# Patient Record
Sex: FEMALE | Race: WHITE | NOT HISPANIC OR LATINO | Employment: UNEMPLOYED | URBAN - METROPOLITAN AREA
[De-identification: names, ages, dates, MRNs, and addresses within clinical notes are randomized per-mention and may not be internally consistent; named-entity substitution may affect disease eponyms.]

---

## 2017-01-01 ENCOUNTER — HOSPITAL ENCOUNTER (OUTPATIENT)
Facility: MEDICAL CENTER | Age: 32
End: 2017-01-01
Attending: SPECIALIST | Admitting: SPECIALIST
Payer: MEDICAID

## 2017-01-01 VITALS — HEIGHT: 66 IN | BODY MASS INDEX: 17.96 KG/M2 | WEIGHT: 111.77 LBS

## 2017-01-01 DIAGNOSIS — Z01.812 PRE-OPERATIVE LABORATORY EXAMINATION: ICD-10-CM

## 2017-01-01 LAB
ERYTHROCYTE [DISTWIDTH] IN BLOOD BY AUTOMATED COUNT: 50.3 FL (ref 35.9–50)
HCT VFR BLD AUTO: 31.8 % (ref 37–47)
HGB BLD-MCNC: 10.6 G/DL (ref 12–16)
MCH RBC QN AUTO: 32.3 PG (ref 27–33)
MCHC RBC AUTO-ENTMCNC: 33.3 G/DL (ref 33.6–35)
MCV RBC AUTO: 97 FL (ref 81.4–97.8)
MORPHOLOGY BLD-IMP: NORMAL
PLATELET # BLD AUTO: 27 K/UL (ref 164–446)
PLATELETS.RETICULATED NFR BLD AUTO: 8.9 K/UL (ref 0.6–13.1)
RBC # BLD AUTO: 3.28 M/UL (ref 4.2–5.4)
WBC # BLD AUTO: 4.2 K/UL (ref 4.8–10.8)

## 2017-01-01 PROCEDURE — 36415 COLL VENOUS BLD VENIPUNCTURE: CPT

## 2017-01-01 PROCEDURE — 85027 COMPLETE CBC AUTOMATED: CPT

## 2017-01-01 PROCEDURE — 85055 RETICULATED PLATELET ASSAY: CPT

## 2018-01-01 ENCOUNTER — APPOINTMENT (OUTPATIENT)
Dept: RADIOLOGY | Facility: MEDICAL CENTER | Age: 33
DRG: 025 | End: 2018-01-01
Attending: NEUROLOGICAL SURGERY
Payer: MEDICAID

## 2018-01-01 ENCOUNTER — RESOLUTE PROFESSIONAL BILLING HOSPITAL PROF FEE (OUTPATIENT)
Dept: HOSPITALIST | Facility: MEDICAL CENTER | Age: 33
End: 2018-01-01
Payer: MEDICAID

## 2018-01-01 ENCOUNTER — HOSPITAL ENCOUNTER (INPATIENT)
Facility: MEDICAL CENTER | Age: 33
LOS: 11 days | DRG: 025 | End: 2018-02-09
Attending: EMERGENCY MEDICINE | Admitting: SURGERY
Payer: MEDICAID

## 2018-01-01 ENCOUNTER — APPOINTMENT (OUTPATIENT)
Dept: RADIOLOGY | Facility: MEDICAL CENTER | Age: 33
DRG: 025 | End: 2018-01-01
Attending: SURGERY
Payer: MEDICAID

## 2018-01-01 ENCOUNTER — APPOINTMENT (OUTPATIENT)
Dept: RADIOLOGY | Facility: MEDICAL CENTER | Age: 33
DRG: 025 | End: 2018-01-01
Attending: EMERGENCY MEDICINE
Payer: MEDICAID

## 2018-01-01 ENCOUNTER — HOSPITAL ENCOUNTER (INPATIENT)
Facility: MEDICAL CENTER | Age: 33
LOS: 1 days | DRG: 951 | End: 2018-02-09
Admitting: SURGERY
Payer: COMMERCIAL

## 2018-01-01 ENCOUNTER — APPOINTMENT (OUTPATIENT)
Dept: RADIOLOGY | Facility: MEDICAL CENTER | Age: 33
DRG: 025 | End: 2018-01-01
Attending: PHYSICIAN ASSISTANT
Payer: MEDICAID

## 2018-01-01 VITALS
HEIGHT: 66 IN | WEIGHT: 197.09 LBS | TEMPERATURE: 101.5 F | OXYGEN SATURATION: 96 % | SYSTOLIC BLOOD PRESSURE: 97 MMHG | BODY MASS INDEX: 31.68 KG/M2 | RESPIRATION RATE: 16 BRPM | DIASTOLIC BLOOD PRESSURE: 48 MMHG | HEART RATE: 82 BPM

## 2018-01-01 DIAGNOSIS — I62.9 INTRACRANIAL HEMORRHAGE (HCC): ICD-10-CM

## 2018-01-01 LAB
ABO GROUP BLD: NORMAL
ABO GROUP BLD: NORMAL
ACTION RANGE TRIGGERED IACRT: NO
ACTION RANGE TRIGGERED IACRT: YES
ACTION RANGE TRIGGERED IACRT: YES
ACTION RH IMMUNE GLOB 8505RHG: NORMAL
ALBUMIN SERPL BCP-MCNC: 2.6 G/DL (ref 3.2–4.9)
ALBUMIN SERPL BCP-MCNC: 2.8 G/DL (ref 3.2–4.9)
ALBUMIN SERPL BCP-MCNC: 3.1 G/DL (ref 3.2–4.9)
ALBUMIN SERPL BCP-MCNC: 3.1 G/DL (ref 3.2–4.9)
ALBUMIN SERPL BCP-MCNC: 3.2 G/DL (ref 3.2–4.9)
ALBUMIN SERPL BCP-MCNC: 3.2 G/DL (ref 3.2–4.9)
ALBUMIN SERPL BCP-MCNC: 4.3 G/DL (ref 3.2–4.9)
ALBUMIN/GLOB SERPL: 0.8 G/DL
ALBUMIN/GLOB SERPL: 1 G/DL
ALBUMIN/GLOB SERPL: 1.3 G/DL
ALBUMIN/GLOB SERPL: 1.4 G/DL
ALBUMIN/GLOB SERPL: 1.6 G/DL
ALBUMIN/GLOB SERPL: 1.7 G/DL
ALP SERPL-CCNC: 110 U/L (ref 30–99)
ALP SERPL-CCNC: 131 U/L (ref 30–99)
ALP SERPL-CCNC: 55 U/L (ref 30–99)
ALP SERPL-CCNC: 55 U/L (ref 30–99)
ALP SERPL-CCNC: 70 U/L (ref 30–99)
ALP SERPL-CCNC: 94 U/L (ref 30–99)
ALT SERPL-CCNC: 21 U/L (ref 2–50)
ALT SERPL-CCNC: 22 U/L (ref 2–50)
ALT SERPL-CCNC: 23 U/L (ref 2–50)
ALT SERPL-CCNC: 24 U/L (ref 2–50)
ALT SERPL-CCNC: 27 U/L (ref 2–50)
ALT SERPL-CCNC: 36 U/L (ref 2–50)
AMPHET UR QL SCN: NEGATIVE
ANION GAP SERPL CALC-SCNC: 11 MMOL/L (ref 0–11.9)
ANION GAP SERPL CALC-SCNC: 14 MMOL/L (ref 0–11.9)
ANION GAP SERPL CALC-SCNC: 14 MMOL/L (ref 0–11.9)
ANION GAP SERPL CALC-SCNC: 15 MMOL/L (ref 0–11.9)
ANION GAP SERPL CALC-SCNC: 15 MMOL/L (ref 0–11.9)
ANION GAP SERPL CALC-SCNC: 3 MMOL/L (ref 0–11.9)
ANION GAP SERPL CALC-SCNC: 4 MMOL/L (ref 0–11.9)
ANION GAP SERPL CALC-SCNC: 4 MMOL/L (ref 0–11.9)
ANION GAP SERPL CALC-SCNC: 5 MMOL/L (ref 0–11.9)
ANION GAP SERPL CALC-SCNC: 6 MMOL/L (ref 0–11.9)
ANION GAP SERPL CALC-SCNC: 7 MMOL/L (ref 0–11.9)
ANION GAP SERPL CALC-SCNC: 8 MMOL/L (ref 0–11.9)
ANISOCYTOSIS BLD QL SMEAR: ABNORMAL
APPEARANCE UR: CLEAR
APPEARANCE UR: CLEAR
APTT PPP: 31.2 SEC (ref 24.7–36)
APTT PPP: 33.3 SEC (ref 24.7–36)
APTT PPP: 33.8 SEC (ref 24.7–36)
APTT PPP: 34.1 SEC (ref 24.7–36)
APTT PPP: 36.4 SEC (ref 24.7–36)
AST SERPL-CCNC: 156 U/L (ref 12–45)
AST SERPL-CCNC: 60 U/L (ref 12–45)
AST SERPL-CCNC: 61 U/L (ref 12–45)
AST SERPL-CCNC: 67 U/L (ref 12–45)
AST SERPL-CCNC: 72 U/L (ref 12–45)
AST SERPL-CCNC: 73 U/L (ref 12–45)
BACTERIA #/AREA URNS HPF: NEGATIVE /HPF
BACTERIA #/AREA URNS HPF: NEGATIVE /HPF
BACTERIA SPEC RESP CULT: ABNORMAL
BARBITURATES UR QL SCN: NEGATIVE
BARCODED ABORH UBTYP: 1700
BARCODED ABORH UBTYP: 5100
BARCODED ABORH UBTYP: 5100
BARCODED ABORH UBTYP: 600
BARCODED ABORH UBTYP: 6200
BARCODED ABORH UBTYP: 6200
BARCODED ABORH UBTYP: 7300
BARCODED ABORH UBTYP: 7300
BARCODED ABORH UBTYP: 8400
BARCODED ABORH UBTYP: 9500
BARCODED PRD CODE UBPRD: NORMAL
BARCODED UNIT NUM UBUNT: NORMAL
BASE EXCESS BLDA CALC-SCNC: -2 MMOL/L (ref -4–3)
BASE EXCESS BLDA CALC-SCNC: -3 MMOL/L (ref -4–3)
BASE EXCESS BLDA CALC-SCNC: -4 MMOL/L (ref -4–3)
BASE EXCESS BLDA CALC-SCNC: -6 MMOL/L (ref -4–3)
BASE EXCESS BLDA CALC-SCNC: -6 MMOL/L (ref -4–3)
BASOPHILS # BLD AUTO: 0 % (ref 0–1.8)
BASOPHILS # BLD AUTO: 0.1 % (ref 0–1.8)
BASOPHILS # BLD AUTO: 0.1 % (ref 0–1.8)
BASOPHILS # BLD AUTO: 0.3 % (ref 0–1.8)
BASOPHILS # BLD AUTO: 0.9 % (ref 0–1.8)
BASOPHILS # BLD AUTO: 1.7 % (ref 0–1.8)
BASOPHILS # BLD: 0 K/UL (ref 0–0.12)
BASOPHILS # BLD: 0.01 K/UL (ref 0–0.12)
BASOPHILS # BLD: 0.01 K/UL (ref 0–0.12)
BASOPHILS # BLD: 0.03 K/UL (ref 0–0.12)
BASOPHILS # BLD: 0.09 K/UL (ref 0–0.12)
BASOPHILS # BLD: 0.13 K/UL (ref 0–0.12)
BASOPHILS # BLD: 0.25 K/UL (ref 0–0.12)
BASOPHILS # BLD: 0.25 K/UL (ref 0–0.12)
BENZODIAZ UR QL SCN: NEGATIVE
BILIRUB CONJ SERPL-MCNC: 1.8 MG/DL (ref 0.1–0.5)
BILIRUB CONJ SERPL-MCNC: 2.4 MG/DL (ref 0.1–0.5)
BILIRUB INDIRECT SERPL-MCNC: 1.3 MG/DL (ref 0–1)
BILIRUB INDIRECT SERPL-MCNC: 1.3 MG/DL (ref 0–1)
BILIRUB SERPL-MCNC: 2.4 MG/DL (ref 0.1–1.5)
BILIRUB SERPL-MCNC: 3.1 MG/DL (ref 0.1–1.5)
BILIRUB SERPL-MCNC: 3.2 MG/DL (ref 0.1–1.5)
BILIRUB SERPL-MCNC: 3.7 MG/DL (ref 0.1–1.5)
BILIRUB SERPL-MCNC: 4.2 MG/DL (ref 0.1–1.5)
BILIRUB SERPL-MCNC: 5.5 MG/DL (ref 0.1–1.5)
BILIRUB UR QL STRIP.AUTO: ABNORMAL
BILIRUB UR QL STRIP.AUTO: NEGATIVE
BLD GP AB INVEST PLASRBC-IMP: NORMAL
BLD GP AB INVEST PLASRBC-IMP: NORMAL
BLD GP AB SCN SERPL QL: NORMAL
BLD GP AB SCN SERPL QL: NORMAL
BODY TEMPERATURE: ABNORMAL DEGREES
BUN SERPL-MCNC: 10 MG/DL (ref 8–22)
BUN SERPL-MCNC: 11 MG/DL (ref 8–22)
BUN SERPL-MCNC: 13 MG/DL (ref 8–22)
BUN SERPL-MCNC: 14 MG/DL (ref 8–22)
BUN SERPL-MCNC: 15 MG/DL (ref 8–22)
BUN SERPL-MCNC: 16 MG/DL (ref 8–22)
BUN SERPL-MCNC: 17 MG/DL (ref 8–22)
BUN SERPL-MCNC: 19 MG/DL (ref 8–22)
BUN SERPL-MCNC: 20 MG/DL (ref 8–22)
BUN SERPL-MCNC: 22 MG/DL (ref 8–22)
BUN SERPL-MCNC: 27 MG/DL (ref 8–22)
BUN SERPL-MCNC: 31 MG/DL (ref 8–22)
BUN SERPL-MCNC: 4 MG/DL (ref 8–22)
BUN SERPL-MCNC: 4 MG/DL (ref 8–22)
BUN SERPL-MCNC: 44 MG/DL (ref 8–22)
BUN SERPL-MCNC: 5 MG/DL (ref 8–22)
BUN SERPL-MCNC: 53 MG/DL (ref 8–22)
BUN SERPL-MCNC: 59 MG/DL (ref 8–22)
BUN SERPL-MCNC: 6 MG/DL (ref 8–22)
BUN SERPL-MCNC: 61 MG/DL (ref 8–22)
BUN SERPL-MCNC: 62 MG/DL (ref 8–22)
BUN SERPL-MCNC: 7 MG/DL (ref 8–22)
BUN SERPL-MCNC: 8 MG/DL (ref 8–22)
BUN SERPL-MCNC: 8 MG/DL (ref 8–22)
BUN SERPL-MCNC: 9 MG/DL (ref 8–22)
BUN SERPL-MCNC: 9 MG/DL (ref 8–22)
BURR CELLS BLD QL SMEAR: NORMAL
BZE UR QL SCN: NEGATIVE
CA-I BLD ISE-SCNC: 0.93 MMOL/L (ref 1.1–1.3)
CALCIUM SERPL-MCNC: 7.2 MG/DL (ref 8.5–10.5)
CALCIUM SERPL-MCNC: 7.2 MG/DL (ref 8.5–10.5)
CALCIUM SERPL-MCNC: 7.3 MG/DL (ref 8.5–10.5)
CALCIUM SERPL-MCNC: 7.4 MG/DL (ref 8.5–10.5)
CALCIUM SERPL-MCNC: 7.5 MG/DL (ref 8.5–10.5)
CALCIUM SERPL-MCNC: 7.6 MG/DL (ref 8.5–10.5)
CALCIUM SERPL-MCNC: 7.7 MG/DL (ref 8.5–10.5)
CALCIUM SERPL-MCNC: 7.8 MG/DL (ref 8.5–10.5)
CALCIUM SERPL-MCNC: 7.9 MG/DL (ref 8.5–10.5)
CALCIUM SERPL-MCNC: 7.9 MG/DL (ref 8.5–10.5)
CALCIUM SERPL-MCNC: 8 MG/DL (ref 8.5–10.5)
CALCIUM SERPL-MCNC: 8.5 MG/DL (ref 8.5–10.5)
CALCIUM SERPL-MCNC: 8.7 MG/DL (ref 8.5–10.5)
CALCIUM SERPL-MCNC: 8.9 MG/DL (ref 8.5–10.5)
CALCIUM SERPL-MCNC: 9.1 MG/DL (ref 8.5–10.5)
CANNABINOIDS UR QL SCN: POSITIVE
CFT BLD TEG: 5.4 MIN (ref 5–10)
CFT BLD TEG: 6.2 MIN (ref 5–10)
CFT BLD TEG: 6.4 MIN (ref 5–10)
CFT BLD TEG: 6.4 MIN (ref 5–10)
CFT BLD TEG: 6.8 MIN (ref 5–10)
CFT BLD TEG: 7.1 MIN (ref 5–10)
CHLORIDE SERPL-SCNC: 103 MMOL/L (ref 96–112)
CHLORIDE SERPL-SCNC: 111 MMOL/L (ref 96–112)
CHLORIDE SERPL-SCNC: 111 MMOL/L (ref 96–112)
CHLORIDE SERPL-SCNC: 112 MMOL/L (ref 96–112)
CHLORIDE SERPL-SCNC: 112 MMOL/L (ref 96–112)
CHLORIDE SERPL-SCNC: 113 MMOL/L (ref 96–112)
CHLORIDE SERPL-SCNC: 114 MMOL/L (ref 96–112)
CHLORIDE SERPL-SCNC: 115 MMOL/L (ref 96–112)
CHLORIDE SERPL-SCNC: 115 MMOL/L (ref 96–112)
CHLORIDE SERPL-SCNC: 116 MMOL/L (ref 96–112)
CHLORIDE SERPL-SCNC: 118 MMOL/L (ref 96–112)
CHLORIDE SERPL-SCNC: 119 MMOL/L (ref 96–112)
CHLORIDE SERPL-SCNC: 119 MMOL/L (ref 96–112)
CHLORIDE SERPL-SCNC: 120 MMOL/L (ref 96–112)
CHLORIDE SERPL-SCNC: 120 MMOL/L (ref 96–112)
CHLORIDE SERPL-SCNC: 121 MMOL/L (ref 96–112)
CHLORIDE SERPL-SCNC: 121 MMOL/L (ref 96–112)
CHLORIDE SERPL-SCNC: 122 MMOL/L (ref 96–112)
CHLORIDE SERPL-SCNC: 122 MMOL/L (ref 96–112)
CHLORIDE SERPL-SCNC: 123 MMOL/L (ref 96–112)
CHLORIDE SERPL-SCNC: 124 MMOL/L (ref 96–112)
CHLORIDE SERPL-SCNC: 124 MMOL/L (ref 96–112)
CHLORIDE SERPL-SCNC: 125 MMOL/L (ref 96–112)
CLOT ANGLE BLD TEG: 58.5 DEGREES (ref 53–72)
CLOT ANGLE BLD TEG: 63.4 DEGREES (ref 53–72)
CLOT ANGLE BLD TEG: 63.5 DEGREES (ref 53–72)
CLOT ANGLE BLD TEG: 66.2 DEGREES (ref 53–72)
CLOT ANGLE BLD TEG: 67.5 DEGREES (ref 53–72)
CLOT ANGLE BLD TEG: 68.6 DEGREES (ref 53–72)
CLOT ANGLE BLD TEG: 68.8 DEGREES (ref 53–72)
CLOT ANGLE BLD TEG: 70.7 DEGREES (ref 53–72)
CLOT LYSIS 30M P MA LENFR BLD TEG: 0 % (ref 0–8)
CLOT LYSIS 30M P MA LENFR BLD TEG: 0.1 % (ref 0–8)
CLOT LYSIS 30M P MA LENFR BLD TEG: 0.3 % (ref 0–8)
CO2 BLDA-SCNC: 19 MMOL/L (ref 20–33)
CO2 BLDA-SCNC: 21 MMOL/L (ref 20–33)
CO2 BLDA-SCNC: 23 MMOL/L (ref 20–33)
CO2 BLDA-SCNC: 24 MMOL/L (ref 20–33)
CO2 BLDA-SCNC: 25 MMOL/L (ref 20–33)
CO2 BLDA-SCNC: 25 MMOL/L (ref 20–33)
CO2 BLDA-SCNC: 27 MMOL/L (ref 20–33)
CO2 SERPL-SCNC: 18 MMOL/L (ref 20–33)
CO2 SERPL-SCNC: 20 MMOL/L (ref 20–33)
CO2 SERPL-SCNC: 21 MMOL/L (ref 20–33)
CO2 SERPL-SCNC: 22 MMOL/L (ref 20–33)
CO2 SERPL-SCNC: 23 MMOL/L (ref 20–33)
CO2 SERPL-SCNC: 24 MMOL/L (ref 20–33)
CO2 SERPL-SCNC: 26 MMOL/L (ref 20–33)
COLOR UR: ABNORMAL
COLOR UR: ABNORMAL
COMMENT 1642: NORMAL
COMMENT 1642: NORMAL
COMPONENT FT 8504FT: NORMAL
COMPONENT P 8504P: NORMAL
COMPONENT R 8504R: NORMAL
CREAT SERPL-MCNC: 0.33 MG/DL (ref 0.5–1.4)
CREAT SERPL-MCNC: 0.36 MG/DL (ref 0.5–1.4)
CREAT SERPL-MCNC: 0.37 MG/DL (ref 0.5–1.4)
CREAT SERPL-MCNC: 0.38 MG/DL (ref 0.5–1.4)
CREAT SERPL-MCNC: 0.39 MG/DL (ref 0.5–1.4)
CREAT SERPL-MCNC: 0.4 MG/DL (ref 0.5–1.4)
CREAT SERPL-MCNC: 0.41 MG/DL (ref 0.5–1.4)
CREAT SERPL-MCNC: 0.42 MG/DL (ref 0.5–1.4)
CREAT SERPL-MCNC: 0.42 MG/DL (ref 0.5–1.4)
CREAT SERPL-MCNC: 0.43 MG/DL (ref 0.5–1.4)
CREAT SERPL-MCNC: 0.43 MG/DL (ref 0.5–1.4)
CREAT SERPL-MCNC: 0.44 MG/DL (ref 0.5–1.4)
CREAT SERPL-MCNC: 0.45 MG/DL (ref 0.5–1.4)
CREAT SERPL-MCNC: 0.49 MG/DL (ref 0.5–1.4)
CREAT SERPL-MCNC: 0.5 MG/DL (ref 0.5–1.4)
CREAT SERPL-MCNC: 0.5 MG/DL (ref 0.5–1.4)
CREAT SERPL-MCNC: 0.68 MG/DL (ref 0.5–1.4)
CREAT SERPL-MCNC: 0.85 MG/DL (ref 0.5–1.4)
CREAT SERPL-MCNC: 1.03 MG/DL (ref 0.5–1.4)
CREAT SERPL-MCNC: 1.09 MG/DL (ref 0.5–1.4)
CREAT SERPL-MCNC: 1.15 MG/DL (ref 0.5–1.4)
CREAT SERPL-MCNC: 1.18 MG/DL (ref 0.5–1.4)
CRP SERPL HS-MCNC: 8.69 MG/DL (ref 0–0.75)
CT.EXTRINSIC BLD ROTEM: 1.5 MIN (ref 1–3)
CT.EXTRINSIC BLD ROTEM: 1.6 MIN (ref 1–3)
CT.EXTRINSIC BLD ROTEM: 1.8 MIN (ref 1–3)
CT.EXTRINSIC BLD ROTEM: 1.8 MIN (ref 1–3)
CT.EXTRINSIC BLD ROTEM: 1.9 MIN (ref 1–3)
CT.EXTRINSIC BLD ROTEM: 2.2 MIN (ref 1–3)
CT.EXTRINSIC BLD ROTEM: 2.4 MIN (ref 1–3)
CT.EXTRINSIC BLD ROTEM: 3.2 MIN (ref 1–3)
DACRYOCYTES BLD QL SMEAR: NORMAL
EKG IMPRESSION: NORMAL
EOSINOPHIL # BLD AUTO: 0 K/UL (ref 0–0.51)
EOSINOPHIL # BLD AUTO: 0.01 K/UL (ref 0–0.51)
EOSINOPHIL # BLD AUTO: 0.03 K/UL (ref 0–0.51)
EOSINOPHIL # BLD AUTO: 0.07 K/UL (ref 0–0.51)
EOSINOPHIL # BLD AUTO: 0.09 K/UL (ref 0–0.51)
EOSINOPHIL # BLD AUTO: 0.25 K/UL (ref 0–0.51)
EOSINOPHIL # BLD AUTO: 0.39 K/UL (ref 0–0.51)
EOSINOPHIL # BLD AUTO: 0.72 K/UL (ref 0–0.51)
EOSINOPHIL NFR BLD: 0 % (ref 0–6.9)
EOSINOPHIL NFR BLD: 0.1 % (ref 0–6.9)
EOSINOPHIL NFR BLD: 0.3 % (ref 0–6.9)
EOSINOPHIL NFR BLD: 0.9 % (ref 0–6.9)
EOSINOPHIL NFR BLD: 1.7 % (ref 0–6.9)
EOSINOPHIL NFR BLD: 2.6 % (ref 0–6.9)
EPI CELLS #/AREA URNS HPF: ABNORMAL /HPF
EPI CELLS #/AREA URNS HPF: ABNORMAL /HPF
ERYTHROCYTE [DISTWIDTH] IN BLOOD BY AUTOMATED COUNT: 51 FL (ref 35.9–50)
ERYTHROCYTE [DISTWIDTH] IN BLOOD BY AUTOMATED COUNT: 52 FL (ref 35.9–50)
ERYTHROCYTE [DISTWIDTH] IN BLOOD BY AUTOMATED COUNT: 53.5 FL (ref 35.9–50)
ERYTHROCYTE [DISTWIDTH] IN BLOOD BY AUTOMATED COUNT: 54.3 FL (ref 35.9–50)
ERYTHROCYTE [DISTWIDTH] IN BLOOD BY AUTOMATED COUNT: 55.6 FL (ref 35.9–50)
ERYTHROCYTE [DISTWIDTH] IN BLOOD BY AUTOMATED COUNT: 56.5 FL (ref 35.9–50)
ERYTHROCYTE [DISTWIDTH] IN BLOOD BY AUTOMATED COUNT: 57.6 FL (ref 35.9–50)
ERYTHROCYTE [DISTWIDTH] IN BLOOD BY AUTOMATED COUNT: 61 FL (ref 35.9–50)
ERYTHROCYTE [DISTWIDTH] IN BLOOD BY AUTOMATED COUNT: 61.5 FL (ref 35.9–50)
ERYTHROCYTE [DISTWIDTH] IN BLOOD BY AUTOMATED COUNT: 61.5 FL (ref 35.9–50)
ERYTHROCYTE [DISTWIDTH] IN BLOOD BY AUTOMATED COUNT: 61.9 FL (ref 35.9–50)
ERYTHROCYTE [DISTWIDTH] IN BLOOD BY AUTOMATED COUNT: 62.8 FL (ref 35.9–50)
ERYTHROCYTE [DISTWIDTH] IN BLOOD BY AUTOMATED COUNT: 63 FL (ref 35.9–50)
ERYTHROCYTE [DISTWIDTH] IN BLOOD BY AUTOMATED COUNT: 63.5 FL (ref 35.9–50)
ERYTHROCYTE [DISTWIDTH] IN BLOOD BY AUTOMATED COUNT: 63.8 FL (ref 35.9–50)
ERYTHROCYTE [DISTWIDTH] IN BLOOD BY AUTOMATED COUNT: 65.2 FL (ref 35.9–50)
ERYTHROCYTE [DISTWIDTH] IN BLOOD BY AUTOMATED COUNT: 67.3 FL (ref 35.9–50)
ERYTHROCYTE [DISTWIDTH] IN BLOOD BY AUTOMATED COUNT: 67.3 FL (ref 35.9–50)
ERYTHROCYTE [DISTWIDTH] IN BLOOD BY AUTOMATED COUNT: 68.8 FL (ref 35.9–50)
ERYTHROCYTE [DISTWIDTH] IN BLOOD BY AUTOMATED COUNT: 69 FL (ref 35.9–50)
ERYTHROCYTE [DISTWIDTH] IN BLOOD BY AUTOMATED COUNT: 69.7 FL (ref 35.9–50)
ERYTHROCYTE [DISTWIDTH] IN BLOOD BY AUTOMATED COUNT: 70.8 FL (ref 35.9–50)
ERYTHROCYTE [DISTWIDTH] IN BLOOD BY AUTOMATED COUNT: 71 FL (ref 35.9–50)
ERYTHROCYTE [DISTWIDTH] IN BLOOD BY AUTOMATED COUNT: 75.8 FL (ref 35.9–50)
ERYTHROCYTE [DISTWIDTH] IN BLOOD BY AUTOMATED COUNT: 77.1 FL (ref 35.9–50)
ERYTHROCYTE [DISTWIDTH] IN BLOOD BY AUTOMATED COUNT: 79.5 FL (ref 35.9–50)
ERYTHROCYTE [DISTWIDTH] IN BLOOD BY AUTOMATED COUNT: 81 FL (ref 35.9–50)
ERYTHROCYTE [DISTWIDTH] IN BLOOD BY AUTOMATED COUNT: 81.7 FL (ref 35.9–50)
ERYTHROCYTE [DISTWIDTH] IN BLOOD BY AUTOMATED COUNT: 82.5 FL (ref 35.9–50)
ERYTHROCYTE [DISTWIDTH] IN BLOOD BY AUTOMATED COUNT: 84.3 FL (ref 35.9–50)
ERYTHROCYTE [DISTWIDTH] IN BLOOD BY AUTOMATED COUNT: 85 FL (ref 35.9–50)
ETHANOL BLD-MCNC: 0.32 G/DL
GLOBULIN SER CALC-MCNC: 1.9 G/DL (ref 1.9–3.5)
GLOBULIN SER CALC-MCNC: 1.9 G/DL (ref 1.9–3.5)
GLOBULIN SER CALC-MCNC: 2.3 G/DL (ref 1.9–3.5)
GLOBULIN SER CALC-MCNC: 2.7 G/DL (ref 1.9–3.5)
GLOBULIN SER CALC-MCNC: 3.1 G/DL (ref 1.9–3.5)
GLOBULIN SER CALC-MCNC: 3.1 G/DL (ref 1.9–3.5)
GLUCOSE BLD-MCNC: 116 MG/DL (ref 65–99)
GLUCOSE BLD-MCNC: 118 MG/DL (ref 65–99)
GLUCOSE BLD-MCNC: 143 MG/DL (ref 65–99)
GLUCOSE BLD-MCNC: 147 MG/DL (ref 65–99)
GLUCOSE BLD-MCNC: 151 MG/DL (ref 65–99)
GLUCOSE SERPL-MCNC: 102 MG/DL (ref 65–99)
GLUCOSE SERPL-MCNC: 108 MG/DL (ref 65–99)
GLUCOSE SERPL-MCNC: 110 MG/DL (ref 65–99)
GLUCOSE SERPL-MCNC: 110 MG/DL (ref 65–99)
GLUCOSE SERPL-MCNC: 111 MG/DL (ref 65–99)
GLUCOSE SERPL-MCNC: 116 MG/DL (ref 65–99)
GLUCOSE SERPL-MCNC: 116 MG/DL (ref 65–99)
GLUCOSE SERPL-MCNC: 117 MG/DL (ref 65–99)
GLUCOSE SERPL-MCNC: 118 MG/DL (ref 65–99)
GLUCOSE SERPL-MCNC: 120 MG/DL (ref 65–99)
GLUCOSE SERPL-MCNC: 121 MG/DL (ref 65–99)
GLUCOSE SERPL-MCNC: 122 MG/DL (ref 65–99)
GLUCOSE SERPL-MCNC: 123 MG/DL (ref 65–99)
GLUCOSE SERPL-MCNC: 123 MG/DL (ref 65–99)
GLUCOSE SERPL-MCNC: 124 MG/DL (ref 65–99)
GLUCOSE SERPL-MCNC: 124 MG/DL (ref 65–99)
GLUCOSE SERPL-MCNC: 127 MG/DL (ref 65–99)
GLUCOSE SERPL-MCNC: 128 MG/DL (ref 65–99)
GLUCOSE SERPL-MCNC: 128 MG/DL (ref 65–99)
GLUCOSE SERPL-MCNC: 130 MG/DL (ref 65–99)
GLUCOSE SERPL-MCNC: 130 MG/DL (ref 65–99)
GLUCOSE SERPL-MCNC: 135 MG/DL (ref 65–99)
GLUCOSE SERPL-MCNC: 135 MG/DL (ref 65–99)
GLUCOSE SERPL-MCNC: 138 MG/DL (ref 65–99)
GLUCOSE SERPL-MCNC: 142 MG/DL (ref 65–99)
GLUCOSE SERPL-MCNC: 145 MG/DL (ref 65–99)
GLUCOSE SERPL-MCNC: 146 MG/DL (ref 65–99)
GLUCOSE SERPL-MCNC: 148 MG/DL (ref 65–99)
GLUCOSE SERPL-MCNC: 154 MG/DL (ref 65–99)
GLUCOSE SERPL-MCNC: 156 MG/DL (ref 65–99)
GLUCOSE SERPL-MCNC: 158 MG/DL (ref 65–99)
GLUCOSE SERPL-MCNC: 166 MG/DL (ref 65–99)
GLUCOSE UR STRIP.AUTO-MCNC: NEGATIVE MG/DL
GLUCOSE UR STRIP.AUTO-MCNC: NEGATIVE MG/DL
GRAM STN SPEC: ABNORMAL
GRAM STN SPEC: ABNORMAL
GRAM STN SPEC: NORMAL
GRAM STN SPEC: NORMAL
HCG SERPL QL: NEGATIVE
HCO3 BLDA-SCNC: 18.3 MMOL/L (ref 17–25)
HCO3 BLDA-SCNC: 19.6 MMOL/L (ref 17–25)
HCO3 BLDA-SCNC: 19.9 MMOL/L (ref 17–25)
HCO3 BLDA-SCNC: 20 MMOL/L (ref 17–25)
HCO3 BLDA-SCNC: 21.9 MMOL/L (ref 17–25)
HCO3 BLDA-SCNC: 22.4 MMOL/L (ref 17–25)
HCO3 BLDA-SCNC: 23.6 MMOL/L (ref 17–25)
HCO3 BLDA-SCNC: 24.1 MMOL/L (ref 17–25)
HCO3 BLDA-SCNC: 25 MMOL/L (ref 17–25)
HCT VFR BLD AUTO: 18.8 % (ref 37–47)
HCT VFR BLD AUTO: 19.4 % (ref 37–47)
HCT VFR BLD AUTO: 20.2 % (ref 37–47)
HCT VFR BLD AUTO: 20.6 % (ref 37–47)
HCT VFR BLD AUTO: 22.1 % (ref 37–47)
HCT VFR BLD AUTO: 22.2 % (ref 37–47)
HCT VFR BLD AUTO: 22.6 % (ref 37–47)
HCT VFR BLD AUTO: 22.6 % (ref 37–47)
HCT VFR BLD AUTO: 22.9 % (ref 37–47)
HCT VFR BLD AUTO: 22.9 % (ref 37–47)
HCT VFR BLD AUTO: 23 % (ref 37–47)
HCT VFR BLD AUTO: 23.3 % (ref 37–47)
HCT VFR BLD AUTO: 23.6 % (ref 37–47)
HCT VFR BLD AUTO: 23.9 % (ref 37–47)
HCT VFR BLD AUTO: 24.1 % (ref 37–47)
HCT VFR BLD AUTO: 24.6 % (ref 37–47)
HCT VFR BLD AUTO: 25.1 % (ref 37–47)
HCT VFR BLD AUTO: 25.5 % (ref 37–47)
HCT VFR BLD AUTO: 25.6 % (ref 37–47)
HCT VFR BLD AUTO: 26.9 % (ref 37–47)
HCT VFR BLD AUTO: 27.3 % (ref 37–47)
HCT VFR BLD AUTO: 27.6 % (ref 37–47)
HCT VFR BLD AUTO: 27.7 % (ref 37–47)
HCT VFR BLD AUTO: 28.2 % (ref 37–47)
HCT VFR BLD AUTO: 28.8 % (ref 37–47)
HCT VFR BLD AUTO: 29.3 % (ref 37–47)
HCT VFR BLD AUTO: 29.5 % (ref 37–47)
HCT VFR BLD AUTO: 29.7 % (ref 37–47)
HCT VFR BLD AUTO: 30.3 % (ref 37–47)
HCT VFR BLD AUTO: 30.3 % (ref 37–47)
HCT VFR BLD AUTO: 30.6 % (ref 37–47)
HCT VFR BLD CALC: 16 % (ref 37–47)
HGB BLD-MCNC: 5.4 G/DL (ref 12–16)
HGB BLD-MCNC: 6.3 G/DL (ref 12–16)
HGB BLD-MCNC: 6.5 G/DL (ref 12–16)
HGB BLD-MCNC: 6.8 G/DL (ref 12–16)
HGB BLD-MCNC: 6.8 G/DL (ref 12–16)
HGB BLD-MCNC: 6.9 G/DL (ref 12–16)
HGB BLD-MCNC: 7.5 G/DL (ref 12–16)
HGB BLD-MCNC: 7.6 G/DL (ref 12–16)
HGB BLD-MCNC: 7.7 G/DL (ref 12–16)
HGB BLD-MCNC: 7.7 G/DL (ref 12–16)
HGB BLD-MCNC: 7.8 G/DL (ref 12–16)
HGB BLD-MCNC: 7.9 G/DL (ref 12–16)
HGB BLD-MCNC: 8 G/DL (ref 12–16)
HGB BLD-MCNC: 8 G/DL (ref 12–16)
HGB BLD-MCNC: 8.1 G/DL (ref 12–16)
HGB BLD-MCNC: 8.2 G/DL (ref 12–16)
HGB BLD-MCNC: 8.5 G/DL (ref 12–16)
HGB BLD-MCNC: 8.5 G/DL (ref 12–16)
HGB BLD-MCNC: 8.7 G/DL (ref 12–16)
HGB BLD-MCNC: 8.9 G/DL (ref 12–16)
HGB BLD-MCNC: 9 G/DL (ref 12–16)
HGB BLD-MCNC: 9.2 G/DL (ref 12–16)
HGB BLD-MCNC: 9.4 G/DL (ref 12–16)
HGB BLD-MCNC: 9.6 G/DL (ref 12–16)
HGB BLD-MCNC: 9.6 G/DL (ref 12–16)
HYALINE CASTS #/AREA URNS LPF: ABNORMAL /LPF
HYALINE CASTS #/AREA URNS LPF: ABNORMAL /LPF
HYPOCHROMIA BLD QL SMEAR: ABNORMAL
HYPOCHROMIA BLD QL SMEAR: ABNORMAL
IMM GRANULOCYTES # BLD AUTO: 0.03 K/UL (ref 0–0.11)
IMM GRANULOCYTES # BLD AUTO: 0.03 K/UL (ref 0–0.11)
IMM GRANULOCYTES # BLD AUTO: 0.12 K/UL (ref 0–0.11)
IMM GRANULOCYTES NFR BLD AUTO: 0.4 % (ref 0–0.9)
IMM GRANULOCYTES NFR BLD AUTO: 0.4 % (ref 0–0.9)
IMM GRANULOCYTES NFR BLD AUTO: 1.1 % (ref 0–0.9)
INR PPP: 1.43 (ref 0.87–1.13)
INR PPP: 1.46 (ref 0.87–1.13)
INR PPP: 1.52 (ref 0.87–1.13)
INR PPP: 1.61 (ref 0.87–1.13)
INR PPP: 1.66 (ref 0.87–1.13)
INR PPP: 1.67 (ref 0.87–1.13)
INST. QUALIFIED PATIENT IIQPT: YES
KETONES UR STRIP.AUTO-MCNC: ABNORMAL MG/DL
KETONES UR STRIP.AUTO-MCNC: NEGATIVE MG/DL
LACTATE BLD-SCNC: 1.9 MMOL/L (ref 0.5–2)
LEUKOCYTE ESTERASE UR QL STRIP.AUTO: ABNORMAL
LEUKOCYTE ESTERASE UR QL STRIP.AUTO: ABNORMAL
LG PLATELETS BLD QL SMEAR: NORMAL
LYMPHOCYTES # BLD AUTO: 0.24 K/UL (ref 1–4.8)
LYMPHOCYTES # BLD AUTO: 0.39 K/UL (ref 1–4.8)
LYMPHOCYTES # BLD AUTO: 0.8 K/UL (ref 1–4.8)
LYMPHOCYTES # BLD AUTO: 1.23 K/UL (ref 1–4.8)
LYMPHOCYTES # BLD AUTO: 1.59 K/UL (ref 1–4.8)
LYMPHOCYTES # BLD AUTO: 1.73 K/UL (ref 1–4.8)
LYMPHOCYTES # BLD AUTO: 1.82 K/UL (ref 1–4.8)
LYMPHOCYTES # BLD AUTO: 2.05 K/UL (ref 1–4.8)
LYMPHOCYTES # BLD AUTO: 2.16 K/UL (ref 1–4.8)
LYMPHOCYTES # BLD AUTO: 2.69 K/UL (ref 1–4.8)
LYMPHOCYTES NFR BLD: 12.3 % (ref 22–41)
LYMPHOCYTES NFR BLD: 15.8 % (ref 22–41)
LYMPHOCYTES NFR BLD: 18.4 % (ref 22–41)
LYMPHOCYTES NFR BLD: 2.6 % (ref 22–41)
LYMPHOCYTES NFR BLD: 27 % (ref 22–41)
LYMPHOCYTES NFR BLD: 3.5 % (ref 22–41)
LYMPHOCYTES NFR BLD: 4.4 % (ref 22–41)
LYMPHOCYTES NFR BLD: 5.3 % (ref 22–41)
LYMPHOCYTES NFR BLD: 7 % (ref 22–41)
LYMPHOCYTES NFR BLD: 7.8 % (ref 22–41)
MACROCYTES BLD QL SMEAR: ABNORMAL
MAGNESIUM SERPL-MCNC: 1.3 MG/DL (ref 1.5–2.5)
MAGNESIUM SERPL-MCNC: 1.4 MG/DL (ref 1.5–2.5)
MAGNESIUM SERPL-MCNC: 1.6 MG/DL (ref 1.5–2.5)
MAGNESIUM SERPL-MCNC: 1.8 MG/DL (ref 1.5–2.5)
MANUAL DIFF BLD: NORMAL
MCF BLD TEG: 42.6 MM (ref 50–70)
MCF BLD TEG: 47.1 MM (ref 50–70)
MCF BLD TEG: 49.6 MM (ref 50–70)
MCF BLD TEG: 54.3 MM (ref 50–70)
MCF BLD TEG: 54.4 MM (ref 50–70)
MCF BLD TEG: 54.4 MM (ref 50–70)
MCF BLD TEG: 54.5 MM (ref 50–70)
MCF BLD TEG: 60.2 MM (ref 50–70)
MCH RBC QN AUTO: 25.1 PG (ref 27–33)
MCH RBC QN AUTO: 28.2 PG (ref 27–33)
MCH RBC QN AUTO: 28.4 PG (ref 27–33)
MCH RBC QN AUTO: 28.6 PG (ref 27–33)
MCH RBC QN AUTO: 28.6 PG (ref 27–33)
MCH RBC QN AUTO: 28.7 PG (ref 27–33)
MCH RBC QN AUTO: 28.7 PG (ref 27–33)
MCH RBC QN AUTO: 28.8 PG (ref 27–33)
MCH RBC QN AUTO: 28.8 PG (ref 27–33)
MCH RBC QN AUTO: 29 PG (ref 27–33)
MCH RBC QN AUTO: 29.2 PG (ref 27–33)
MCH RBC QN AUTO: 29.3 PG (ref 27–33)
MCH RBC QN AUTO: 29.3 PG (ref 27–33)
MCH RBC QN AUTO: 29.4 PG (ref 27–33)
MCH RBC QN AUTO: 29.4 PG (ref 27–33)
MCH RBC QN AUTO: 29.5 PG (ref 27–33)
MCH RBC QN AUTO: 29.6 PG (ref 27–33)
MCH RBC QN AUTO: 29.7 PG (ref 27–33)
MCH RBC QN AUTO: 29.8 PG (ref 27–33)
MCH RBC QN AUTO: 29.9 PG (ref 27–33)
MCH RBC QN AUTO: 30 PG (ref 27–33)
MCH RBC QN AUTO: 30.1 PG (ref 27–33)
MCH RBC QN AUTO: 30.2 PG (ref 27–33)
MCH RBC QN AUTO: 30.4 PG (ref 27–33)
MCH RBC QN AUTO: 30.4 PG (ref 27–33)
MCH RBC QN AUTO: 30.7 PG (ref 27–33)
MCHC RBC AUTO-ENTMCNC: 29.2 G/DL (ref 33.6–35)
MCHC RBC AUTO-ENTMCNC: 29.3 G/DL (ref 33.6–35)
MCHC RBC AUTO-ENTMCNC: 29.4 G/DL (ref 33.6–35)
MCHC RBC AUTO-ENTMCNC: 30.1 G/DL (ref 33.6–35)
MCHC RBC AUTO-ENTMCNC: 30.2 G/DL (ref 33.6–35)
MCHC RBC AUTO-ENTMCNC: 30.4 G/DL (ref 33.6–35)
MCHC RBC AUTO-ENTMCNC: 30.5 G/DL (ref 33.6–35)
MCHC RBC AUTO-ENTMCNC: 30.6 G/DL (ref 33.6–35)
MCHC RBC AUTO-ENTMCNC: 30.7 G/DL (ref 33.6–35)
MCHC RBC AUTO-ENTMCNC: 30.8 G/DL (ref 33.6–35)
MCHC RBC AUTO-ENTMCNC: 31 G/DL (ref 33.6–35)
MCHC RBC AUTO-ENTMCNC: 31 G/DL (ref 33.6–35)
MCHC RBC AUTO-ENTMCNC: 31.2 G/DL (ref 33.6–35)
MCHC RBC AUTO-ENTMCNC: 31.4 G/DL (ref 33.6–35)
MCHC RBC AUTO-ENTMCNC: 31.6 G/DL (ref 33.6–35)
MCHC RBC AUTO-ENTMCNC: 31.7 G/DL (ref 33.6–35)
MCHC RBC AUTO-ENTMCNC: 31.7 G/DL (ref 33.6–35)
MCHC RBC AUTO-ENTMCNC: 32 G/DL (ref 33.6–35)
MCHC RBC AUTO-ENTMCNC: 32.6 G/DL (ref 33.6–35)
MCHC RBC AUTO-ENTMCNC: 32.8 G/DL (ref 33.6–35)
MCHC RBC AUTO-ENTMCNC: 33.2 G/DL (ref 33.6–35)
MCHC RBC AUTO-ENTMCNC: 33.5 G/DL (ref 33.6–35)
MCHC RBC AUTO-ENTMCNC: 33.7 G/DL (ref 33.6–35)
MCHC RBC AUTO-ENTMCNC: 34.5 G/DL (ref 33.6–35)
MCHC RBC AUTO-ENTMCNC: 34.7 G/DL (ref 33.6–35)
MCV RBC AUTO: 100 FL (ref 81.4–97.8)
MCV RBC AUTO: 101.8 FL (ref 81.4–97.8)
MCV RBC AUTO: 101.8 FL (ref 81.4–97.8)
MCV RBC AUTO: 83.2 FL (ref 81.4–97.8)
MCV RBC AUTO: 85.4 FL (ref 81.4–97.8)
MCV RBC AUTO: 85.5 FL (ref 81.4–97.8)
MCV RBC AUTO: 85.8 FL (ref 81.4–97.8)
MCV RBC AUTO: 86.1 FL (ref 81.4–97.8)
MCV RBC AUTO: 86.3 FL (ref 81.4–97.8)
MCV RBC AUTO: 86.9 FL (ref 81.4–97.8)
MCV RBC AUTO: 87.1 FL (ref 81.4–97.8)
MCV RBC AUTO: 88.1 FL (ref 81.4–97.8)
MCV RBC AUTO: 88.1 FL (ref 81.4–97.8)
MCV RBC AUTO: 88.6 FL (ref 81.4–97.8)
MCV RBC AUTO: 88.9 FL (ref 81.4–97.8)
MCV RBC AUTO: 89.3 FL (ref 81.4–97.8)
MCV RBC AUTO: 89.8 FL (ref 81.4–97.8)
MCV RBC AUTO: 90.4 FL (ref 81.4–97.8)
MCV RBC AUTO: 90.9 FL (ref 81.4–97.8)
MCV RBC AUTO: 92.4 FL (ref 81.4–97.8)
MCV RBC AUTO: 93.7 FL (ref 81.4–97.8)
MCV RBC AUTO: 95 FL (ref 81.4–97.8)
MCV RBC AUTO: 95.3 FL (ref 81.4–97.8)
MCV RBC AUTO: 95.5 FL (ref 81.4–97.8)
MCV RBC AUTO: 95.5 FL (ref 81.4–97.8)
MCV RBC AUTO: 95.6 FL (ref 81.4–97.8)
MCV RBC AUTO: 95.7 FL (ref 81.4–97.8)
MCV RBC AUTO: 96.4 FL (ref 81.4–97.8)
MCV RBC AUTO: 98.6 FL (ref 81.4–97.8)
MCV RBC AUTO: 99.3 FL (ref 81.4–97.8)
MCV RBC AUTO: 99.6 FL (ref 81.4–97.8)
METAMYELOCYTES NFR BLD MANUAL: 0.9 %
METHADONE UR QL SCN: NEGATIVE
MICRO URNS: ABNORMAL
MICRO URNS: ABNORMAL
MICROCYTES BLD QL SMEAR: ABNORMAL
MONOCYTES # BLD AUTO: 0.25 K/UL (ref 0–0.85)
MONOCYTES # BLD AUTO: 0.27 K/UL (ref 0–0.85)
MONOCYTES # BLD AUTO: 0.37 K/UL (ref 0–0.85)
MONOCYTES # BLD AUTO: 0.48 K/UL (ref 0–0.85)
MONOCYTES # BLD AUTO: 0.5 K/UL (ref 0–0.85)
MONOCYTES # BLD AUTO: 0.8 K/UL (ref 0–0.85)
MONOCYTES # BLD AUTO: 1 K/UL (ref 0–0.85)
MONOCYTES # BLD AUTO: 1.04 K/UL (ref 0–0.85)
MONOCYTES # BLD AUTO: 1.16 K/UL (ref 0–0.85)
MONOCYTES # BLD AUTO: 1.69 K/UL (ref 0–0.85)
MONOCYTES NFR BLD AUTO: 1.7 % (ref 0–13.4)
MONOCYTES NFR BLD AUTO: 1.8 % (ref 0–13.4)
MONOCYTES NFR BLD AUTO: 10.5 % (ref 0–13.4)
MONOCYTES NFR BLD AUTO: 15.4 % (ref 0–13.4)
MONOCYTES NFR BLD AUTO: 2.6 % (ref 0–13.4)
MONOCYTES NFR BLD AUTO: 3.4 % (ref 0–13.4)
MONOCYTES NFR BLD AUTO: 3.5 % (ref 0–13.4)
MONOCYTES NFR BLD AUTO: 4.4 % (ref 0–13.4)
MONOCYTES NFR BLD AUTO: 5.3 % (ref 0–13.4)
MONOCYTES NFR BLD AUTO: 5.4 % (ref 0–13.4)
MORPHOLOGY BLD-IMP: NORMAL
MYELOCYTES NFR BLD MANUAL: 0.9 %
MYELOCYTES NFR BLD MANUAL: 1.7 %
NEUTROPHILS # BLD AUTO: 18.05 K/UL (ref 2–7.15)
NEUTROPHILS # BLD AUTO: 19.73 K/UL (ref 2–7.15)
NEUTROPHILS # BLD AUTO: 23.6 K/UL (ref 2–7.15)
NEUTROPHILS # BLD AUTO: 25.79 K/UL (ref 2–7.15)
NEUTROPHILS # BLD AUTO: 29.29 K/UL (ref 2–7.15)
NEUTROPHILS # BLD AUTO: 5.08 K/UL (ref 2–7.15)
NEUTROPHILS # BLD AUTO: 6.23 K/UL (ref 2–7.15)
NEUTROPHILS # BLD AUTO: 6.68 K/UL (ref 2–7.15)
NEUTROPHILS # BLD AUTO: 6.71 K/UL (ref 2–7.15)
NEUTROPHILS # BLD AUTO: 7.34 K/UL (ref 2–7.15)
NEUTROPHILS NFR BLD: 64 % (ref 44–72)
NEUTROPHILS NFR BLD: 66.9 % (ref 44–72)
NEUTROPHILS NFR BLD: 67.1 % (ref 44–72)
NEUTROPHILS NFR BLD: 79.1 % (ref 44–72)
NEUTROPHILS NFR BLD: 82.4 % (ref 44–72)
NEUTROPHILS NFR BLD: 85.2 % (ref 44–72)
NEUTROPHILS NFR BLD: 90.6 % (ref 44–72)
NEUTROPHILS NFR BLD: 90.7 % (ref 44–72)
NEUTROPHILS NFR BLD: 91.2 % (ref 44–72)
NEUTROPHILS NFR BLD: 93.1 % (ref 44–72)
NEUTS BAND NFR BLD MANUAL: 0.9 % (ref 0–10)
NEUTS BAND NFR BLD MANUAL: 1.7 % (ref 0–10)
NEUTS BAND NFR BLD MANUAL: 3.5 % (ref 0–10)
NEUTS BAND NFR BLD MANUAL: 7.8 % (ref 0–10)
NITRITE UR QL STRIP.AUTO: NEGATIVE
NITRITE UR QL STRIP.AUTO: POSITIVE
NRBC # BLD AUTO: 0 K/UL
NRBC # BLD AUTO: 0.02 K/UL
NRBC # BLD AUTO: 0.02 K/UL
NRBC # BLD AUTO: 0.04 K/UL
NRBC # BLD AUTO: 0.05 K/UL
NRBC # BLD AUTO: 0.07 K/UL
NRBC # BLD AUTO: 0.08 K/UL
NRBC # BLD AUTO: 0.13 K/UL
NRBC BLD-RTO: 0 /100 WBC
NRBC BLD-RTO: 0.1 /100 WBC
NRBC BLD-RTO: 0.2 /100 WBC
NRBC BLD-RTO: 0.2 /100 WBC
NRBC BLD-RTO: 0.7 /100 WBC
NRBC BLD-RTO: 1.3 /100 WBC
O2/TOTAL GAS SETTING VFR VENT: 100 %
O2/TOTAL GAS SETTING VFR VENT: 30 %
O2/TOTAL GAS SETTING VFR VENT: 30 %
O2/TOTAL GAS SETTING VFR VENT: 50 %
O2/TOTAL GAS SETTING VFR VENT: 60 %
O2/TOTAL GAS SETTING VFR VENT: 95 %
OPIATES UR QL SCN: NEGATIVE
OVALOCYTES BLD QL SMEAR: NORMAL
OXYCODONE UR QL SCN: NEGATIVE
PA AA BLD-ACNC: 100 %
PA AA BLD-ACNC: 53.4 %
PA AA BLD-ACNC: 76.4 %
PA AA BLD-ACNC: 90.1 %
PA AA BLD-ACNC: 90.5 %
PA AA BLD-ACNC: 93.4 %
PA AA BLD-ACNC: 95.5 %
PA AA BLD-ACNC: 97.4 %
PA ADP BLD-ACNC: 16.4 %
PA ADP BLD-ACNC: 30.5 %
PA ADP BLD-ACNC: 45.1 %
PA ADP BLD-ACNC: 49.1 %
PA ADP BLD-ACNC: 57.2 %
PA ADP BLD-ACNC: 65.3 %
PA ADP BLD-ACNC: 74.7 %
PA ADP BLD-ACNC: 89.4 %
PATHOLOGY CONSULT NOTE: NORMAL
PCO2 BLDA: 29.3 MMHG (ref 26–37)
PCO2 BLDA: 30.2 MMHG (ref 26–37)
PCO2 BLDA: 30.5 MMHG (ref 26–37)
PCO2 BLDA: 35.8 MMHG (ref 26–37)
PCO2 BLDA: 37.2 MMHG (ref 26–37)
PCO2 BLDA: 43.8 MMHG (ref 26–37)
PCO2 BLDA: 47.9 MMHG (ref 26–37)
PCO2 BLDA: 51.2 MMHG (ref 26–37)
PCO2 BLDA: 60 MMHG (ref 26–37)
PCO2 TEMP ADJ BLDA: 25.8 MMHG (ref 26–37)
PCO2 TEMP ADJ BLDA: 29 MMHG (ref 26–37)
PCO2 TEMP ADJ BLDA: 30.5 MMHG (ref 26–37)
PCO2 TEMP ADJ BLDA: 36.9 MMHG (ref 26–37)
PCO2 TEMP ADJ BLDA: 45.5 MMHG (ref 26–37)
PCO2 TEMP ADJ BLDA: 47.2 MMHG (ref 26–37)
PCO2 TEMP ADJ BLDA: 51.2 MMHG (ref 26–37)
PCO2 TEMP ADJ BLDA: 62 MMHG (ref 26–37)
PCP UR QL SCN: NEGATIVE
PH BLDA: 7.23 [PH] (ref 7.4–7.5)
PH BLDA: 7.25 [PH] (ref 7.4–7.5)
PH BLDA: 7.3 [PH] (ref 7.4–7.5)
PH BLDA: 7.35 [PH] (ref 7.4–7.5)
PH BLDA: 7.35 [PH] (ref 7.4–7.5)
PH BLDA: 7.38 [PH] (ref 7.4–7.5)
PH BLDA: 7.4 [PH] (ref 7.4–7.5)
PH BLDA: 7.42 [PH] (ref 7.4–7.5)
PH BLDA: 7.43 [PH] (ref 7.4–7.5)
PH TEMP ADJ BLDA: 7.22 [PH] (ref 7.4–7.5)
PH TEMP ADJ BLDA: 7.25 [PH] (ref 7.4–7.5)
PH TEMP ADJ BLDA: 7.3 [PH] (ref 7.4–7.5)
PH TEMP ADJ BLDA: 7.33 [PH] (ref 7.4–7.5)
PH TEMP ADJ BLDA: 7.34 [PH] (ref 7.4–7.5)
PH TEMP ADJ BLDA: 7.42 [PH] (ref 7.4–7.5)
PH TEMP ADJ BLDA: 7.44 [PH] (ref 7.4–7.5)
PH TEMP ADJ BLDA: 7.45 [PH] (ref 7.4–7.5)
PH UR STRIP.AUTO: 5.5 [PH]
PH UR STRIP.AUTO: 6 [PH]
PHOSPHATE SERPL-MCNC: 1 MG/DL (ref 2.5–4.5)
PHOSPHATE SERPL-MCNC: 2.2 MG/DL (ref 2.5–4.5)
PHOSPHATE SERPL-MCNC: 2.6 MG/DL (ref 2.5–4.5)
PHOSPHATE SERPL-MCNC: 2.7 MG/DL (ref 2.5–4.5)
PHOSPHATE SERPL-MCNC: 3 MG/DL (ref 2.5–4.5)
PHOSPHATE SERPL-MCNC: 3.3 MG/DL (ref 2.5–4.5)
PHOSPHATE SERPL-MCNC: 3.6 MG/DL (ref 2.5–4.5)
PHOSPHATE SERPL-MCNC: 5.1 MG/DL (ref 2.5–4.5)
PHOSPHATE SERPL-MCNC: <1 MG/DL (ref 2.5–4.5)
PLATELET # BLD AUTO: 110 K/UL (ref 164–446)
PLATELET # BLD AUTO: 117 K/UL (ref 164–446)
PLATELET # BLD AUTO: 130 K/UL (ref 164–446)
PLATELET # BLD AUTO: 151 K/UL (ref 164–446)
PLATELET # BLD AUTO: 185 K/UL (ref 164–446)
PLATELET # BLD AUTO: 198 K/UL (ref 164–446)
PLATELET # BLD AUTO: 216 K/UL (ref 164–446)
PLATELET # BLD AUTO: 227 K/UL (ref 164–446)
PLATELET # BLD AUTO: 230 K/UL (ref 164–446)
PLATELET # BLD AUTO: 239 K/UL (ref 164–446)
PLATELET # BLD AUTO: 27 K/UL (ref 164–446)
PLATELET # BLD AUTO: 39 K/UL (ref 164–446)
PLATELET # BLD AUTO: 41 K/UL (ref 164–446)
PLATELET # BLD AUTO: 50 K/UL (ref 164–446)
PLATELET # BLD AUTO: 52 K/UL (ref 164–446)
PLATELET # BLD AUTO: 53 K/UL (ref 164–446)
PLATELET # BLD AUTO: 54 K/UL (ref 164–446)
PLATELET # BLD AUTO: 55 K/UL (ref 164–446)
PLATELET # BLD AUTO: 57 K/UL (ref 164–446)
PLATELET # BLD AUTO: 59 K/UL (ref 164–446)
PLATELET # BLD AUTO: 59 K/UL (ref 164–446)
PLATELET # BLD AUTO: 60 K/UL (ref 164–446)
PLATELET # BLD AUTO: 61 K/UL (ref 164–446)
PLATELET # BLD AUTO: 62 K/UL (ref 164–446)
PLATELET # BLD AUTO: 65 K/UL (ref 164–446)
PLATELET # BLD AUTO: 69 K/UL (ref 164–446)
PLATELET # BLD AUTO: 74 K/UL (ref 164–446)
PLATELET # BLD AUTO: 80 K/UL (ref 164–446)
PLATELET # BLD AUTO: 85 K/UL (ref 164–446)
PLATELET # BLD AUTO: 95 K/UL (ref 164–446)
PLATELET # BLD AUTO: 99 K/UL (ref 164–446)
PLATELET BLD QL SMEAR: NORMAL
PLATELETS.RETICULATED NFR BLD AUTO: 4.7 K/UL (ref 0.6–13.1)
PLATELETS.RETICULATED NFR BLD AUTO: 6.3 K/UL (ref 0.6–13.1)
PLATELETS.RETICULATED NFR BLD AUTO: 7.1 K/UL (ref 0.6–13.1)
PMV BLD AUTO: 10.6 FL (ref 9–12.9)
PMV BLD AUTO: 10.6 FL (ref 9–12.9)
PMV BLD AUTO: 11 FL (ref 9–12.9)
PMV BLD AUTO: 11 FL (ref 9–12.9)
PMV BLD AUTO: 11.1 FL (ref 9–12.9)
PMV BLD AUTO: 11.1 FL (ref 9–12.9)
PMV BLD AUTO: 11.2 FL (ref 9–12.9)
PMV BLD AUTO: 11.2 FL (ref 9–12.9)
PMV BLD AUTO: 11.3 FL (ref 9–12.9)
PMV BLD AUTO: 11.4 FL (ref 9–12.9)
PMV BLD AUTO: 11.4 FL (ref 9–12.9)
PMV BLD AUTO: 11.5 FL (ref 9–12.9)
PMV BLD AUTO: 11.5 FL (ref 9–12.9)
PMV BLD AUTO: 11.6 FL (ref 9–12.9)
PMV BLD AUTO: 11.7 FL (ref 9–12.9)
PMV BLD AUTO: 11.8 FL (ref 9–12.9)
PMV BLD AUTO: 11.8 FL (ref 9–12.9)
PMV BLD AUTO: 11.9 FL (ref 9–12.9)
PMV BLD AUTO: 11.9 FL (ref 9–12.9)
PMV BLD AUTO: 12 FL (ref 9–12.9)
PMV BLD AUTO: 12 FL (ref 9–12.9)
PMV BLD AUTO: 12.1 FL (ref 9–12.9)
PMV BLD AUTO: 12.4 FL (ref 9–12.9)
PMV BLD AUTO: 13.2 FL (ref 9–12.9)
PO2 BLDA: 111 MMHG (ref 64–87)
PO2 BLDA: 226 MMHG (ref 64–87)
PO2 BLDA: 435 MMHG (ref 64–87)
PO2 BLDA: 54 MMHG (ref 64–87)
PO2 BLDA: 58 MMHG (ref 64–87)
PO2 BLDA: 62 MMHG (ref 64–87)
PO2 BLDA: 76 MMHG (ref 64–87)
PO2 BLDA: 93 MMHG (ref 64–87)
PO2 BLDA: 98 MMHG (ref 64–87)
PO2 TEMP ADJ BLDA: 111 MMHG (ref 64–87)
PO2 TEMP ADJ BLDA: 435 MMHG (ref 64–87)
PO2 TEMP ADJ BLDA: 55 MMHG (ref 64–87)
PO2 TEMP ADJ BLDA: 57 MMHG (ref 64–87)
PO2 TEMP ADJ BLDA: 65 MMHG (ref 64–87)
PO2 TEMP ADJ BLDA: 80 MMHG (ref 64–87)
PO2 TEMP ADJ BLDA: 82 MMHG (ref 64–87)
PO2 TEMP ADJ BLDA: 91 MMHG (ref 64–87)
POIKILOCYTOSIS BLD QL SMEAR: NORMAL
POLYCHROMASIA BLD QL SMEAR: NORMAL
POTASSIUM BLD-SCNC: 3.7 MMOL/L (ref 3.6–5.5)
POTASSIUM SERPL-SCNC: 2.9 MMOL/L (ref 3.6–5.5)
POTASSIUM SERPL-SCNC: 3.3 MMOL/L (ref 3.6–5.5)
POTASSIUM SERPL-SCNC: 3.3 MMOL/L (ref 3.6–5.5)
POTASSIUM SERPL-SCNC: 3.4 MMOL/L (ref 3.6–5.5)
POTASSIUM SERPL-SCNC: 3.4 MMOL/L (ref 3.6–5.5)
POTASSIUM SERPL-SCNC: 3.5 MMOL/L (ref 3.6–5.5)
POTASSIUM SERPL-SCNC: 3.6 MMOL/L (ref 3.6–5.5)
POTASSIUM SERPL-SCNC: 3.7 MMOL/L (ref 3.6–5.5)
POTASSIUM SERPL-SCNC: 3.8 MMOL/L (ref 3.6–5.5)
POTASSIUM SERPL-SCNC: 3.8 MMOL/L (ref 3.6–5.5)
POTASSIUM SERPL-SCNC: 3.9 MMOL/L (ref 3.6–5.5)
POTASSIUM SERPL-SCNC: 4.5 MMOL/L (ref 3.6–5.5)
POTASSIUM SERPL-SCNC: 4.5 MMOL/L (ref 3.6–5.5)
POTASSIUM SERPL-SCNC: 4.6 MMOL/L (ref 3.6–5.5)
POTASSIUM SERPL-SCNC: 4.7 MMOL/L (ref 3.6–5.5)
POTASSIUM SERPL-SCNC: 4.9 MMOL/L (ref 3.6–5.5)
POTASSIUM SERPL-SCNC: 5.1 MMOL/L (ref 3.6–5.5)
PREALB SERPL-MCNC: 3 MG/DL (ref 18–38)
PRODUCT TYPE UPROD: NORMAL
PROPOXYPH UR QL SCN: NEGATIVE
PROT SERPL-MCNC: 5 G/DL (ref 6–8.2)
PROT SERPL-MCNC: 5.1 G/DL (ref 6–8.2)
PROT SERPL-MCNC: 5.4 G/DL (ref 6–8.2)
PROT SERPL-MCNC: 5.5 G/DL (ref 6–8.2)
PROT SERPL-MCNC: 5.7 G/DL (ref 6–8.2)
PROT SERPL-MCNC: 7.4 G/DL (ref 6–8.2)
PROT UR QL STRIP: 30 MG/DL
PROT UR QL STRIP: NEGATIVE MG/DL
PROTHROMBIN TIME: 17.1 SEC (ref 12–14.6)
PROTHROMBIN TIME: 17.4 SEC (ref 12–14.6)
PROTHROMBIN TIME: 18 SEC (ref 12–14.6)
PROTHROMBIN TIME: 18.8 SEC (ref 12–14.6)
PROTHROMBIN TIME: 19.3 SEC (ref 12–14.6)
PROTHROMBIN TIME: 19.4 SEC (ref 12–14.6)
RBC # BLD AUTO: 2.19 M/UL (ref 4.2–5.4)
RBC # BLD AUTO: 2.27 M/UL (ref 4.2–5.4)
RBC # BLD AUTO: 2.31 M/UL (ref 4.2–5.4)
RBC # BLD AUTO: 2.32 M/UL (ref 4.2–5.4)
RBC # BLD AUTO: 2.34 M/UL (ref 4.2–5.4)
RBC # BLD AUTO: 2.55 M/UL (ref 4.2–5.4)
RBC # BLD AUTO: 2.56 M/UL (ref 4.2–5.4)
RBC # BLD AUTO: 2.6 M/UL (ref 4.2–5.4)
RBC # BLD AUTO: 2.6 M/UL (ref 4.2–5.4)
RBC # BLD AUTO: 2.62 M/UL (ref 4.2–5.4)
RBC # BLD AUTO: 2.62 M/UL (ref 4.2–5.4)
RBC # BLD AUTO: 2.65 M/UL (ref 4.2–5.4)
RBC # BLD AUTO: 2.66 M/UL (ref 4.2–5.4)
RBC # BLD AUTO: 2.67 M/UL (ref 4.2–5.4)
RBC # BLD AUTO: 2.68 M/UL (ref 4.2–5.4)
RBC # BLD AUTO: 2.68 M/UL (ref 4.2–5.4)
RBC # BLD AUTO: 2.71 M/UL (ref 4.2–5.4)
RBC # BLD AUTO: 2.72 M/UL (ref 4.2–5.4)
RBC # BLD AUTO: 2.72 M/UL (ref 4.2–5.4)
RBC # BLD AUTO: 2.74 M/UL (ref 4.2–5.4)
RBC # BLD AUTO: 2.75 M/UL (ref 4.2–5.4)
RBC # BLD AUTO: 2.77 M/UL (ref 4.2–5.4)
RBC # BLD AUTO: 2.77 M/UL (ref 4.2–5.4)
RBC # BLD AUTO: 2.8 M/UL (ref 4.2–5.4)
RBC # BLD AUTO: 2.93 M/UL (ref 4.2–5.4)
RBC # BLD AUTO: 3.06 M/UL (ref 4.2–5.4)
RBC # BLD AUTO: 3.11 M/UL (ref 4.2–5.4)
RBC # BLD AUTO: 3.18 M/UL (ref 4.2–5.4)
RBC # BLD AUTO: 3.19 M/UL (ref 4.2–5.4)
RBC # BLD AUTO: 3.2 M/UL (ref 4.2–5.4)
RBC # BLD AUTO: 3.46 M/UL (ref 4.2–5.4)
RBC # URNS HPF: ABNORMAL /HPF
RBC # URNS HPF: ABNORMAL /HPF
RBC BLD AUTO: PRESENT
RBC UR QL AUTO: NEGATIVE
RBC UR QL AUTO: NEGATIVE
RH BLD: NORMAL
RH BLD: NORMAL
SAO2 % BLDA: 100 % (ref 93–99)
SAO2 % BLDA: 100 % (ref 93–99)
SAO2 % BLDA: 86 % (ref 93–99)
SAO2 % BLDA: 90 % (ref 93–99)
SAO2 % BLDA: 91 % (ref 93–99)
SAO2 % BLDA: 92 % (ref 93–99)
SAO2 % BLDA: 96 % (ref 93–99)
SAO2 % BLDA: 98 % (ref 93–99)
SAO2 % BLDA: 99 % (ref 93–99)
SCHISTOCYTES BLD QL SMEAR: NORMAL
SIGNIFICANT IND 70042: ABNORMAL
SIGNIFICANT IND 70042: ABNORMAL
SIGNIFICANT IND 70042: NORMAL
SIGNIFICANT IND 70042: NORMAL
SITE SITE: ABNORMAL
SITE SITE: ABNORMAL
SITE SITE: NORMAL
SITE SITE: NORMAL
SODIUM BLD-SCNC: 145 MMOL/L (ref 135–145)
SODIUM SERPL-SCNC: 139 MMOL/L (ref 135–145)
SODIUM SERPL-SCNC: 140 MMOL/L (ref 135–145)
SODIUM SERPL-SCNC: 141 MMOL/L (ref 135–145)
SODIUM SERPL-SCNC: 142 MMOL/L (ref 135–145)
SODIUM SERPL-SCNC: 143 MMOL/L (ref 135–145)
SODIUM SERPL-SCNC: 143 MMOL/L (ref 135–145)
SODIUM SERPL-SCNC: 144 MMOL/L (ref 135–145)
SODIUM SERPL-SCNC: 145 MMOL/L (ref 135–145)
SODIUM SERPL-SCNC: 145 MMOL/L (ref 135–145)
SODIUM SERPL-SCNC: 146 MMOL/L (ref 135–145)
SODIUM SERPL-SCNC: 146 MMOL/L (ref 135–145)
SODIUM SERPL-SCNC: 147 MMOL/L (ref 135–145)
SODIUM SERPL-SCNC: 148 MMOL/L (ref 135–145)
SODIUM SERPL-SCNC: 149 MMOL/L (ref 135–145)
SODIUM SERPL-SCNC: 150 MMOL/L (ref 135–145)
SODIUM SERPL-SCNC: 150 MMOL/L (ref 135–145)
SODIUM SERPL-SCNC: 151 MMOL/L (ref 135–145)
SODIUM SERPL-SCNC: 152 MMOL/L (ref 135–145)
SODIUM SERPL-SCNC: 153 MMOL/L (ref 135–145)
SODIUM SERPL-SCNC: 153 MMOL/L (ref 135–145)
SODIUM SERPL-SCNC: 154 MMOL/L (ref 135–145)
SOURCE SOURCE: ABNORMAL
SOURCE SOURCE: ABNORMAL
SOURCE SOURCE: NORMAL
SOURCE SOURCE: NORMAL
SP GR UR STRIP.AUTO: 1.02
SP GR UR STRIP.AUTO: 1.02
SPECIMEN DRAWN FROM PATIENT: ABNORMAL
TEG ALGORITHM TGALG: ABNORMAL
TEG ALGORITHM TGALG: NORMAL
TOXIC GRANULES BLD QL SMEAR: SLIGHT
TOXIC GRANULES BLD QL SMEAR: SLIGHT
TRIGL SERPL-MCNC: 101 MG/DL (ref 0–149)
TRIGL SERPL-MCNC: 143 MG/DL (ref 0–149)
TRIGL SERPL-MCNC: 146 MG/DL (ref 0–149)
TRIGL SERPL-MCNC: 186 MG/DL (ref 0–149)
UNIT STATUS USTAT: NORMAL
UROBILINOGEN UR STRIP.AUTO-MCNC: 1 MG/DL
UROBILINOGEN UR STRIP.AUTO-MCNC: 1 MG/DL
WBC # BLD AUTO: 10.1 K/UL (ref 4.8–10.8)
WBC # BLD AUTO: 10.9 K/UL (ref 4.8–10.8)
WBC # BLD AUTO: 11.1 K/UL (ref 4.8–10.8)
WBC # BLD AUTO: 11.8 K/UL (ref 4.8–10.8)
WBC # BLD AUTO: 15.4 K/UL (ref 4.8–10.8)
WBC # BLD AUTO: 16.4 K/UL (ref 4.8–10.8)
WBC # BLD AUTO: 17.9 K/UL (ref 4.8–10.8)
WBC # BLD AUTO: 21.9 K/UL (ref 4.8–10.8)
WBC # BLD AUTO: 22.7 K/UL (ref 4.8–10.8)
WBC # BLD AUTO: 27.7 K/UL (ref 4.8–10.8)
WBC # BLD AUTO: 28 K/UL (ref 4.8–10.8)
WBC # BLD AUTO: 29.2 K/UL (ref 4.8–10.8)
WBC # BLD AUTO: 30.9 K/UL (ref 4.8–10.8)
WBC # BLD AUTO: 32 K/UL (ref 4.8–10.8)
WBC # BLD AUTO: 32.5 K/UL (ref 4.8–10.8)
WBC # BLD AUTO: 4 K/UL (ref 4.8–10.8)
WBC # BLD AUTO: 6.4 K/UL (ref 4.8–10.8)
WBC # BLD AUTO: 6.9 K/UL (ref 4.8–10.8)
WBC # BLD AUTO: 7.2 K/UL (ref 4.8–10.8)
WBC # BLD AUTO: 7.4 K/UL (ref 4.8–10.8)
WBC # BLD AUTO: 7.5 K/UL (ref 4.8–10.8)
WBC # BLD AUTO: 7.6 K/UL (ref 4.8–10.8)
WBC # BLD AUTO: 7.7 K/UL (ref 4.8–10.8)
WBC # BLD AUTO: 7.9 K/UL (ref 4.8–10.8)
WBC # BLD AUTO: 8.1 K/UL (ref 4.8–10.8)
WBC # BLD AUTO: 8.9 K/UL (ref 4.8–10.8)
WBC # BLD AUTO: 9.2 K/UL (ref 4.8–10.8)
WBC # BLD AUTO: 9.4 K/UL (ref 4.8–10.8)
WBC # BLD AUTO: 9.6 K/UL (ref 4.8–10.8)
WBC # BLD AUTO: 9.7 K/UL (ref 4.8–10.8)
WBC # BLD AUTO: 9.9 K/UL (ref 4.8–10.8)
WBC #/AREA URNS HPF: ABNORMAL /HPF
WBC #/AREA URNS HPF: ABNORMAL /HPF

## 2018-01-01 PROCEDURE — 770022 HCHG ROOM/CARE - ICU (200)

## 2018-01-01 PROCEDURE — 83605 ASSAY OF LACTIC ACID: CPT

## 2018-01-01 PROCEDURE — A9270 NON-COVERED ITEM OR SERVICE: HCPCS | Performed by: SURGERY

## 2018-01-01 PROCEDURE — 71045 X-RAY EXAM CHEST 1 VIEW: CPT

## 2018-01-01 PROCEDURE — 81001 URINALYSIS AUTO W/SCOPE: CPT

## 2018-01-01 PROCEDURE — 85576 BLOOD PLATELET AGGREGATION: CPT

## 2018-01-01 PROCEDURE — 700112 HCHG RX REV CODE 229: Performed by: SURGERY

## 2018-01-01 PROCEDURE — 85027 COMPLETE CBC AUTOMATED: CPT

## 2018-01-01 PROCEDURE — P9034 PLATELETS, PHERESIS: HCPCS

## 2018-01-01 PROCEDURE — 37799 UNLISTED PX VASCULAR SURGERY: CPT

## 2018-01-01 PROCEDURE — 500889 HCHG PACK, NEURO: Performed by: NEUROLOGICAL SURGERY

## 2018-01-01 PROCEDURE — 306637 HCHG MISC ORTHO ITEM RC 0274

## 2018-01-01 PROCEDURE — 700102 HCHG RX REV CODE 250 W/ 637 OVERRIDE(OP): Performed by: SURGERY

## 2018-01-01 PROCEDURE — 94003 VENT MGMT INPAT SUBQ DAY: CPT

## 2018-01-01 PROCEDURE — 87186 SC STD MICRODIL/AGAR DIL: CPT

## 2018-01-01 PROCEDURE — 99292 CRITICAL CARE ADDL 30 MIN: CPT | Performed by: SURGERY

## 2018-01-01 PROCEDURE — 700111 HCHG RX REV CODE 636 W/ 250 OVERRIDE (IP): Performed by: EMERGENCY MEDICINE

## 2018-01-01 PROCEDURE — 99291 CRITICAL CARE FIRST HOUR: CPT | Performed by: SURGERY

## 2018-01-01 PROCEDURE — 84100 ASSAY OF PHOSPHORUS: CPT

## 2018-01-01 PROCEDURE — P9017 PLASMA 1 DONOR FRZ W/IN 8 HR: HCPCS

## 2018-01-01 PROCEDURE — 700101 HCHG RX REV CODE 250: Performed by: SURGERY

## 2018-01-01 PROCEDURE — 82803 BLOOD GASES ANY COMBINATION: CPT

## 2018-01-01 PROCEDURE — 85730 THROMBOPLASTIN TIME PARTIAL: CPT

## 2018-01-01 PROCEDURE — 700111 HCHG RX REV CODE 636 W/ 250 OVERRIDE (IP): Performed by: SURGERY

## 2018-01-01 PROCEDURE — 700105 HCHG RX REV CODE 258: Performed by: SURGERY

## 2018-01-01 PROCEDURE — 500331 HCHG COTTONOID, SURG PATTIE: Performed by: NEUROLOGICAL SURGERY

## 2018-01-01 PROCEDURE — 84478 ASSAY OF TRIGLYCERIDES: CPT

## 2018-01-01 PROCEDURE — 84132 ASSAY OF SERUM POTASSIUM: CPT

## 2018-01-01 PROCEDURE — 94002 VENT MGMT INPAT INIT DAY: CPT

## 2018-01-01 PROCEDURE — 80048 BASIC METABOLIC PNL TOTAL CA: CPT

## 2018-01-01 PROCEDURE — 80069 RENAL FUNCTION PANEL: CPT

## 2018-01-01 PROCEDURE — 80307 DRUG TEST PRSMV CHEM ANLYZR: CPT

## 2018-01-01 PROCEDURE — 99291 CRITICAL CARE FIRST HOUR: CPT

## 2018-01-01 PROCEDURE — 85384 FIBRINOGEN ACTIVITY: CPT

## 2018-01-01 PROCEDURE — 82248 BILIRUBIN DIRECT: CPT

## 2018-01-01 PROCEDURE — 86922 COMPATIBILITY TEST ANTIGLOB: CPT

## 2018-01-01 PROCEDURE — 80053 COMPREHEN METABOLIC PANEL: CPT

## 2018-01-01 PROCEDURE — 31624 DX BRONCHOSCOPE/LAVAGE: CPT | Mod: 76 | Performed by: SURGERY

## 2018-01-01 PROCEDURE — 85576 BLOOD PLATELET AGGREGATION: CPT | Mod: 91

## 2018-01-01 PROCEDURE — 501838 HCHG SUTURE GENERAL

## 2018-01-01 PROCEDURE — 94770 HCHG CO2 EXPIRED GAS DETERMINATION: CPT

## 2018-01-01 PROCEDURE — 87077 CULTURE AEROBIC IDENTIFY: CPT

## 2018-01-01 PROCEDURE — 85347 COAGULATION TIME ACTIVATED: CPT

## 2018-01-01 PROCEDURE — 85025 COMPLETE CBC W/AUTO DIFF WBC: CPT

## 2018-01-01 PROCEDURE — 85610 PROTHROMBIN TIME: CPT

## 2018-01-01 PROCEDURE — 84134 ASSAY OF PREALBUMIN: CPT

## 2018-01-01 PROCEDURE — 0BH18EZ INSERTION OF ENDOTRACHEAL AIRWAY INTO TRACHEA, VIA NATURAL OR ARTIFICIAL OPENING ENDOSCOPIC: ICD-10-PCS | Performed by: EMERGENCY MEDICINE

## 2018-01-01 PROCEDURE — 160042 HCHG SURGERY MINUTES - EA ADDL 1 MIN LEVEL 5

## 2018-01-01 PROCEDURE — 82947 ASSAY GLUCOSE BLOOD QUANT: CPT

## 2018-01-01 PROCEDURE — 85027 COMPLETE CBC AUTOMATED: CPT | Mod: 91

## 2018-01-01 PROCEDURE — 83735 ASSAY OF MAGNESIUM: CPT

## 2018-01-01 PROCEDURE — 36600 WITHDRAWAL OF ARTERIAL BLOOD: CPT

## 2018-01-01 PROCEDURE — 500389 HCHG DRAIN, RESERVOIR SUCT JP 100CC: Performed by: NEUROLOGICAL SURGERY

## 2018-01-01 PROCEDURE — 501837 HCHG SUTURE CV

## 2018-01-01 PROCEDURE — 501838 HCHG SUTURE GENERAL: Performed by: NEUROLOGICAL SURGERY

## 2018-01-01 PROCEDURE — 160031 HCHG SURGERY MINUTES - 1ST 30 MINS LEVEL 5

## 2018-01-01 PROCEDURE — 304538 HCHG NG TUBE

## 2018-01-01 PROCEDURE — 30243R1 TRANSFUSION OF NONAUTOLOGOUS PLATELETS INTO CENTRAL VEIN, PERCUTANEOUS APPROACH: ICD-10-PCS | Performed by: NEUROLOGICAL SURGERY

## 2018-01-01 PROCEDURE — 500697 HCHG HEMOCLIP, LARGE (ORANGE)

## 2018-01-01 PROCEDURE — 85007 BL SMEAR W/DIFF WBC COUNT: CPT

## 2018-01-01 PROCEDURE — 80048 BASIC METABOLIC PNL TOTAL CA: CPT | Mod: 91

## 2018-01-01 PROCEDURE — 0HB6XZX EXCISION OF BACK SKIN, EXTERNAL APPROACH, DIAGNOSTIC: ICD-10-PCS | Performed by: SURGERY

## 2018-01-01 PROCEDURE — 500445 HCHG HEMOSTAT, SURGICEL 4X8: Performed by: NEUROLOGICAL SURGERY

## 2018-01-01 PROCEDURE — 302154 K THERMIA BLANKET 24X60: Performed by: SURGERY

## 2018-01-01 PROCEDURE — 86923 COMPATIBILITY TEST ELECTRIC: CPT

## 2018-01-01 PROCEDURE — 87086 URINE CULTURE/COLONY COUNT: CPT

## 2018-01-01 PROCEDURE — 0B9J8ZX DRAINAGE OF LEFT LOWER LUNG LOBE, VIA NATURAL OR ARTIFICIAL OPENING ENDOSCOPIC, DIAGNOSTIC: ICD-10-PCS | Performed by: SURGERY

## 2018-01-01 PROCEDURE — 86850 RBC ANTIBODY SCREEN: CPT

## 2018-01-01 PROCEDURE — 30243N1 TRANSFUSION OF NONAUTOLOGOUS RED BLOOD CELLS INTO CENTRAL VEIN, PERCUTANEOUS APPROACH: ICD-10-PCS | Performed by: NEUROLOGICAL SURGERY

## 2018-01-01 PROCEDURE — 160048 HCHG OR STATISTICAL LEVEL 1-5

## 2018-01-01 PROCEDURE — 500363 HCHG DISSECT TOOL, ANSPACH S: Performed by: NEUROLOGICAL SURGERY

## 2018-01-01 PROCEDURE — 700111 HCHG RX REV CODE 636 W/ 250 OVERRIDE (IP)

## 2018-01-01 PROCEDURE — 160029 HCHG SURGERY MINUTES - 1ST 30 MINS LEVEL 4: Performed by: NEUROLOGICAL SURGERY

## 2018-01-01 PROCEDURE — 302043 TAPE, HYPAFIX: Performed by: SURGERY

## 2018-01-01 PROCEDURE — 36415 COLL VENOUS BLD VENIPUNCTURE: CPT

## 2018-01-01 PROCEDURE — 02HV33Z INSERTION OF INFUSION DEVICE INTO SUPERIOR VENA CAVA, PERCUTANEOUS APPROACH: ICD-10-PCS | Performed by: SURGERY

## 2018-01-01 PROCEDURE — 70496 CT ANGIOGRAPHY HEAD: CPT

## 2018-01-01 PROCEDURE — 87205 SMEAR GRAM STAIN: CPT

## 2018-01-01 PROCEDURE — 87070 CULTURE OTHR SPECIMN AEROBIC: CPT

## 2018-01-01 PROCEDURE — 501445 HCHG STAPLER, SKIN DISP: Performed by: NEUROLOGICAL SURGERY

## 2018-01-01 PROCEDURE — 36430 TRANSFUSION BLD/BLD COMPNT: CPT

## 2018-01-01 PROCEDURE — 96365 THER/PROPH/DIAG IV INF INIT: CPT

## 2018-01-01 PROCEDURE — 85384 FIBRINOGEN ACTIVITY: CPT | Mod: 91

## 2018-01-01 PROCEDURE — P9016 RBC LEUKOCYTES REDUCED: HCPCS

## 2018-01-01 PROCEDURE — 502240 HCHG MISC OR SUPPLY RC 0272: Performed by: NEUROLOGICAL SURGERY

## 2018-01-01 PROCEDURE — 86900 BLOOD TYPING SEROLOGIC ABO: CPT

## 2018-01-01 PROCEDURE — 302978 HCHG BRONCHOSCOPY-DIAGNOSTIC

## 2018-01-01 PROCEDURE — 110454 HCHG SHELL REV 250: Performed by: NEUROLOGICAL SURGERY

## 2018-01-01 PROCEDURE — 86870 RBC ANTIBODY IDENTIFICATION: CPT

## 2018-01-01 PROCEDURE — 11400 EXC TR-EXT B9+MARG 0.5 CM<: CPT | Performed by: SURGERY

## 2018-01-01 PROCEDURE — 82962 GLUCOSE BLOOD TEST: CPT

## 2018-01-01 PROCEDURE — 70450 CT HEAD/BRAIN W/O DYE: CPT

## 2018-01-01 PROCEDURE — 500303 HCHG CLIP, SCALP: Performed by: NEUROLOGICAL SURGERY

## 2018-01-01 PROCEDURE — 84703 CHORIONIC GONADOTROPIN ASSAY: CPT

## 2018-01-01 PROCEDURE — 99291 CRITICAL CARE FIRST HOUR: CPT | Mod: 25 | Performed by: SURGERY

## 2018-01-01 PROCEDURE — 86140 C-REACTIVE PROTEIN: CPT

## 2018-01-01 PROCEDURE — P9016 RBC LEUKOCYTES REDUCED: HCPCS | Mod: 91

## 2018-01-01 PROCEDURE — 500075 HCHG BLADE, CLIPPER NEURO: Performed by: NEUROLOGICAL SURGERY

## 2018-01-01 PROCEDURE — 93005 ELECTROCARDIOGRAM TRACING: CPT | Performed by: EMERGENCY MEDICINE

## 2018-01-01 PROCEDURE — 85014 HEMATOCRIT: CPT

## 2018-01-01 PROCEDURE — 72125 CT NECK SPINE W/O DYE: CPT

## 2018-01-01 PROCEDURE — 85055 RETICULATED PLATELET ASSAY: CPT

## 2018-01-01 PROCEDURE — 31500 INSERT EMERGENCY AIRWAY: CPT

## 2018-01-01 PROCEDURE — P9034 PLATELETS, PHERESIS: HCPCS | Mod: 91

## 2018-01-01 PROCEDURE — 302977 HCHG BRONCHOSCOPY PROC-THERAPEUTIC

## 2018-01-01 PROCEDURE — 160009 HCHG ANES TIME/MIN: Performed by: NEUROLOGICAL SURGERY

## 2018-01-01 PROCEDURE — 86901 BLOOD TYPING SEROLOGIC RH(D): CPT

## 2018-01-01 PROCEDURE — 87040 BLOOD CULTURE FOR BACTERIA: CPT

## 2018-01-01 PROCEDURE — 93971 EXTREMITY STUDY: CPT

## 2018-01-01 PROCEDURE — 009400Z DRAINAGE OF INTRACRANIAL SUBDURAL SPACE WITH DRAINAGE DEVICE, OPEN APPROACH: ICD-10-PCS | Performed by: NEUROLOGICAL SURGERY

## 2018-01-01 PROCEDURE — 88305 TISSUE EXAM BY PATHOLOGIST: CPT

## 2018-01-01 PROCEDURE — 84295 ASSAY OF SERUM SODIUM: CPT

## 2018-01-01 PROCEDURE — 96375 TX/PRO/DX INJ NEW DRUG ADDON: CPT

## 2018-01-01 PROCEDURE — 700101 HCHG RX REV CODE 250

## 2018-01-01 PROCEDURE — 85347 COAGULATION TIME ACTIVATED: CPT | Mod: 91

## 2018-01-01 PROCEDURE — C1725 CATH, TRANSLUMIN NON-LASER: HCPCS | Performed by: NEUROLOGICAL SURGERY

## 2018-01-01 PROCEDURE — 99291 CRITICAL CARE FIRST HOUR: CPT | Performed by: HOSPITALIST

## 2018-01-01 PROCEDURE — 82330 ASSAY OF CALCIUM: CPT

## 2018-01-01 PROCEDURE — 5A1955Z RESPIRATORY VENTILATION, GREATER THAN 96 CONSECUTIVE HOURS: ICD-10-PCS | Performed by: EMERGENCY MEDICINE

## 2018-01-01 PROCEDURE — 00900ZZ DRAINAGE OF BRAIN, OPEN APPROACH: ICD-10-PCS | Performed by: NEUROLOGICAL SURGERY

## 2018-01-01 PROCEDURE — 70551 MRI BRAIN STEM W/O DYE: CPT

## 2018-01-01 PROCEDURE — 85007 BL SMEAR W/DIFF WBC COUNT: CPT | Mod: 91

## 2018-01-01 PROCEDURE — 30243K1 TRANSFUSION OF NONAUTOLOGOUS FROZEN PLASMA INTO CENTRAL VEIN, PERCUTANEOUS APPROACH: ICD-10-PCS | Performed by: NEUROLOGICAL SURGERY

## 2018-01-01 PROCEDURE — 160041 HCHG SURGERY MINUTES - EA ADDL 1 MIN LEVEL 4: Performed by: NEUROLOGICAL SURGERY

## 2018-01-01 PROCEDURE — 0B9F8ZX DRAINAGE OF RIGHT LOWER LUNG LOBE, VIA NATURAL OR ARTIFICIAL OPENING ENDOSCOPIC, DIAGNOSTIC: ICD-10-PCS | Performed by: SURGERY

## 2018-01-01 PROCEDURE — 0W310ZZ CONTROL BLEEDING IN CRANIAL CAVITY, OPEN APPROACH: ICD-10-PCS | Performed by: NEUROLOGICAL SURGERY

## 2018-01-01 PROCEDURE — 160048 HCHG OR STATISTICAL LEVEL 1-5: Performed by: NEUROLOGICAL SURGERY

## 2018-01-01 DEVICE — GRAFT DURAMATRIX ONLAY PLUS 3IN X 3IN OR 7.5CM X 7.5CM: Type: IMPLANTABLE DEVICE | Status: FUNCTIONAL

## 2018-01-01 RX ORDER — MAGNESIUM SULFATE HEPTAHYDRATE 40 MG/ML
2 INJECTION, SOLUTION INTRAVENOUS ONCE
Status: COMPLETED | OUTPATIENT
Start: 2018-01-01 | End: 2018-01-01

## 2018-01-01 RX ORDER — DOCUSATE SODIUM 50 MG/5ML
100 LIQUID ORAL 2 TIMES DAILY
Status: DISCONTINUED | OUTPATIENT
Start: 2018-01-01 | End: 2018-01-01

## 2018-01-01 RX ORDER — SODIUM CHLORIDE 9 MG/ML
1000 INJECTION, SOLUTION INTRAVENOUS ONCE
Status: COMPLETED | OUTPATIENT
Start: 2018-01-01 | End: 2018-01-01

## 2018-01-01 RX ORDER — SODIUM CHLORIDE, SODIUM LACTATE, POTASSIUM CHLORIDE, CALCIUM CHLORIDE 600; 310; 30; 20 MG/100ML; MG/100ML; MG/100ML; MG/100ML
1000 INJECTION, SOLUTION INTRAVENOUS ONCE
Status: COMPLETED | OUTPATIENT
Start: 2018-01-01 | End: 2018-01-01

## 2018-01-01 RX ORDER — 3% SODIUM CHLORIDE 3 G/100ML
500 INJECTION, SOLUTION INTRAVENOUS CONTINUOUS
Status: DISCONTINUED | OUTPATIENT
Start: 2018-01-01 | End: 2018-01-01

## 2018-01-01 RX ORDER — AMOXICILLIN 250 MG
1 CAPSULE ORAL NIGHTLY
Status: DISCONTINUED | OUTPATIENT
Start: 2018-01-01 | End: 2018-02-12 | Stop reason: HOSPADM

## 2018-01-01 RX ORDER — SUCCINYLCHOLINE CHLORIDE 20 MG/ML
INJECTION INTRAMUSCULAR; INTRAVENOUS
Status: DISCONTINUED | OUTPATIENT
Start: 2018-01-01 | End: 2018-01-01

## 2018-01-01 RX ORDER — BUPIVACAINE HYDROCHLORIDE AND EPINEPHRINE 5; 5 MG/ML; UG/ML
INJECTION, SOLUTION EPIDURAL; INTRACAUDAL; PERINEURAL
Status: DISCONTINUED | OUTPATIENT
Start: 2018-01-01 | End: 2018-01-01 | Stop reason: HOSPADM

## 2018-01-01 RX ORDER — HEPARIN SODIUM 10000 [USP'U]/ML
27000 INJECTION, SOLUTION INTRAVENOUS; SUBCUTANEOUS
Status: DISCONTINUED | OUTPATIENT
Start: 2018-01-01 | End: 2018-02-12 | Stop reason: HOSPADM

## 2018-01-01 RX ORDER — MORPHINE SULFATE 10 MG/ML
5 INJECTION, SOLUTION INTRAMUSCULAR; INTRAVENOUS
Status: DISCONTINUED | OUTPATIENT
Start: 2018-01-01 | End: 2018-02-12 | Stop reason: HOSPADM

## 2018-01-01 RX ORDER — FAMOTIDINE 20 MG/1
20 TABLET, FILM COATED ORAL 2 TIMES DAILY
Status: DISCONTINUED | OUTPATIENT
Start: 2018-01-01 | End: 2018-01-01

## 2018-01-01 RX ORDER — LORAZEPAM 2 MG/ML
5 INJECTION INTRAMUSCULAR
Status: DISCONTINUED | OUTPATIENT
Start: 2018-01-01 | End: 2018-02-12 | Stop reason: HOSPADM

## 2018-01-01 RX ORDER — HYDRALAZINE HYDROCHLORIDE 20 MG/ML
20 INJECTION INTRAMUSCULAR; INTRAVENOUS EVERY 6 HOURS PRN
Status: DISCONTINUED | OUTPATIENT
Start: 2018-01-01 | End: 2018-02-12 | Stop reason: HOSPADM

## 2018-01-01 RX ORDER — 3% SODIUM CHLORIDE 3 G/100ML
250 INJECTION, SOLUTION INTRAVENOUS ONCE
Status: COMPLETED | OUTPATIENT
Start: 2018-01-01 | End: 2018-01-01

## 2018-01-01 RX ORDER — BACITRACIN 50000 [IU]/1
INJECTION, POWDER, FOR SOLUTION INTRAMUSCULAR
Status: DISCONTINUED | OUTPATIENT
Start: 2018-01-01 | End: 2018-01-01 | Stop reason: HOSPADM

## 2018-01-01 RX ORDER — ENEMA 19; 7 G/133ML; G/133ML
1 ENEMA RECTAL
Status: DISCONTINUED | OUTPATIENT
Start: 2018-01-01 | End: 2018-02-12 | Stop reason: HOSPADM

## 2018-01-01 RX ORDER — DOCUSATE SODIUM 100 MG/1
100 CAPSULE, LIQUID FILLED ORAL 2 TIMES DAILY
Status: DISCONTINUED | OUTPATIENT
Start: 2018-01-01 | End: 2018-01-01

## 2018-01-01 RX ORDER — POLYETHYLENE GLYCOL 3350 17 G/17G
1 POWDER, FOR SOLUTION ORAL 2 TIMES DAILY
Status: DISCONTINUED | OUTPATIENT
Start: 2018-01-01 | End: 2018-02-12 | Stop reason: HOSPADM

## 2018-01-01 RX ORDER — BISACODYL 10 MG
10 SUPPOSITORY, RECTAL RECTAL
Status: DISCONTINUED | OUTPATIENT
Start: 2018-01-01 | End: 2018-02-12 | Stop reason: HOSPADM

## 2018-01-01 RX ORDER — SODIUM CHLORIDE, SODIUM LACTATE, POTASSIUM CHLORIDE, CALCIUM CHLORIDE 600; 310; 30; 20 MG/100ML; MG/100ML; MG/100ML; MG/100ML
250 INJECTION, SOLUTION INTRAVENOUS ONCE
Status: COMPLETED | OUTPATIENT
Start: 2018-01-01 | End: 2018-01-01

## 2018-01-01 RX ORDER — 3% SODIUM CHLORIDE 3 G/100ML
250 INJECTION, SOLUTION INTRAVENOUS ONCE
Status: DISCONTINUED | OUTPATIENT
Start: 2018-01-01 | End: 2018-01-01

## 2018-01-01 RX ORDER — ONDANSETRON 2 MG/ML
4 INJECTION INTRAMUSCULAR; INTRAVENOUS EVERY 4 HOURS PRN
Status: DISCONTINUED | OUTPATIENT
Start: 2018-01-01 | End: 2018-02-12 | Stop reason: HOSPADM

## 2018-01-01 RX ORDER — ENALAPRILAT 1.25 MG/ML
1.25 INJECTION INTRAVENOUS EVERY 6 HOURS PRN
Status: DISCONTINUED | OUTPATIENT
Start: 2018-01-01 | End: 2018-02-12 | Stop reason: HOSPADM

## 2018-01-01 RX ORDER — AMOXICILLIN 250 MG
1 CAPSULE ORAL
Status: DISCONTINUED | OUTPATIENT
Start: 2018-01-01 | End: 2018-02-12 | Stop reason: HOSPADM

## 2018-01-01 RX ORDER — DEXTROSE MONOHYDRATE 25 G/50ML
25 INJECTION, SOLUTION INTRAVENOUS
Status: DISCONTINUED | OUTPATIENT
Start: 2018-01-01 | End: 2018-01-01

## 2018-01-01 RX ORDER — ETOMIDATE 2 MG/ML
INJECTION INTRAVENOUS
Status: DISCONTINUED | OUTPATIENT
Start: 2018-01-01 | End: 2018-01-01

## 2018-01-01 RX ORDER — MANNITOL 250 MG/ML
50 INJECTION, SOLUTION INTRAVENOUS ONCE
Status: COMPLETED | OUTPATIENT
Start: 2018-01-01 | End: 2018-01-01

## 2018-01-01 RX ORDER — MAGNESIUM SULFATE HEPTAHYDRATE 40 MG/ML
4 INJECTION, SOLUTION INTRAVENOUS ONCE
Status: COMPLETED | OUTPATIENT
Start: 2018-01-01 | End: 2018-01-01

## 2018-01-01 RX ORDER — DOCUSATE SODIUM 50 MG/5ML
100 LIQUID ORAL 2 TIMES DAILY
Status: DISCONTINUED | OUTPATIENT
Start: 2018-01-01 | End: 2018-02-12 | Stop reason: HOSPADM

## 2018-01-01 RX ORDER — MANNITOL 250 MG/ML
INJECTION, SOLUTION INTRAVENOUS
Status: DISCONTINUED
Start: 2018-01-01 | End: 2018-01-01

## 2018-01-01 RX ORDER — AMLODIPINE BESYLATE 10 MG/1
5 TABLET ORAL
Status: DISCONTINUED | OUTPATIENT
Start: 2018-01-01 | End: 2018-02-12 | Stop reason: HOSPADM

## 2018-01-01 RX ORDER — CHLORHEXIDINE GLUCONATE ORAL RINSE 1.2 MG/ML
15 SOLUTION DENTAL EVERY 12 HOURS
Status: DISCONTINUED | OUTPATIENT
Start: 2018-01-01 | End: 2018-02-12 | Stop reason: HOSPADM

## 2018-01-01 RX ORDER — LINEZOLID 600 MG/1
600 TABLET, FILM COATED ORAL EVERY 12 HOURS
Status: DISCONTINUED | OUTPATIENT
Start: 2018-01-01 | End: 2018-02-12 | Stop reason: HOSPADM

## 2018-01-01 RX ORDER — 3% SODIUM CHLORIDE 3 G/100ML
500 INJECTION, SOLUTION INTRAVENOUS CONTINUOUS
Status: DISCONTINUED | OUTPATIENT
Start: 2018-01-01 | End: 2018-02-12 | Stop reason: HOSPADM

## 2018-01-01 RX ORDER — ACETAMINOPHEN 325 MG/1
650 TABLET ORAL EVERY 4 HOURS PRN
Status: DISCONTINUED | OUTPATIENT
Start: 2018-01-01 | End: 2018-02-12 | Stop reason: HOSPADM

## 2018-01-01 RX ORDER — DOCUSATE SODIUM 100 MG/1
100 CAPSULE, LIQUID FILLED ORAL 2 TIMES DAILY
Status: DISCONTINUED | OUTPATIENT
Start: 2018-01-01 | End: 2018-02-12 | Stop reason: HOSPADM

## 2018-01-01 RX ORDER — SODIUM CHLORIDE, SODIUM LACTATE, POTASSIUM CHLORIDE, CALCIUM CHLORIDE 600; 310; 30; 20 MG/100ML; MG/100ML; MG/100ML; MG/100ML
INJECTION, SOLUTION INTRAVENOUS CONTINUOUS
Status: DISCONTINUED | OUTPATIENT
Start: 2018-01-01 | End: 2018-02-12 | Stop reason: HOSPADM

## 2018-01-01 RX ADMIN — ACETAMINOPHEN 650 MG: 325 TABLET, FILM COATED ORAL at 22:16

## 2018-01-01 RX ADMIN — FENTANYL CITRATE 50 MCG: 50 INJECTION INTRAMUSCULAR; INTRAVENOUS at 15:06

## 2018-01-01 RX ADMIN — HYDRALAZINE HYDROCHLORIDE 20 MG: 20 INJECTION INTRAMUSCULAR; INTRAVENOUS at 04:45

## 2018-01-01 RX ADMIN — PROPOFOL 10 MCG/KG/MIN: 10 INJECTION, EMULSION INTRAVENOUS at 10:22

## 2018-01-01 RX ADMIN — DEXTROSE MONOHYDRATE 500 MG: 50 INJECTION, SOLUTION INTRAVENOUS at 20:20

## 2018-01-01 RX ADMIN — DEXTROSE MONOHYDRATE 500 MG: 50 INJECTION, SOLUTION INTRAVENOUS at 08:14

## 2018-01-01 RX ADMIN — DOCUSATE SODIUM 100 MG: 50 LIQUID ORAL at 08:13

## 2018-01-01 RX ADMIN — AMLODIPINE BESYLATE 5 MG: 10 TABLET ORAL at 08:11

## 2018-01-01 RX ADMIN — SODIUM CHLORIDE, POTASSIUM CHLORIDE, SODIUM LACTATE AND CALCIUM CHLORIDE: 600; 310; 30; 20 INJECTION, SOLUTION INTRAVENOUS at 21:25

## 2018-01-01 RX ADMIN — SODIUM CHLORIDE, POTASSIUM CHLORIDE, SODIUM LACTATE AND CALCIUM CHLORIDE: 600; 310; 30; 20 INJECTION, SOLUTION INTRAVENOUS at 06:05

## 2018-01-01 RX ADMIN — CHLORHEXIDINE GLUCONATE 15 ML: 1.2 RINSE ORAL at 20:22

## 2018-01-01 RX ADMIN — METOPROLOL TARTRATE 25 MG: 25 TABLET, FILM COATED ORAL at 21:27

## 2018-01-01 RX ADMIN — PROPOFOL 5 MCG/KG/MIN: 10 INJECTION, EMULSION INTRAVENOUS at 13:04

## 2018-01-01 RX ADMIN — ACETAMINOPHEN 650 MG: 325 TABLET, FILM COATED ORAL at 09:19

## 2018-01-01 RX ADMIN — DOCUSATE SODIUM 100 MG: 50 LIQUID ORAL at 08:23

## 2018-01-01 RX ADMIN — FENTANYL CITRATE 100 MCG: 50 INJECTION INTRAMUSCULAR; INTRAVENOUS at 04:22

## 2018-01-01 RX ADMIN — CHLORHEXIDINE GLUCONATE 15 ML: 1.2 RINSE ORAL at 20:03

## 2018-01-01 RX ADMIN — OMEPRAZOLE 40 MG: 20 CAPSULE, DELAYED RELEASE ORAL at 08:13

## 2018-01-01 RX ADMIN — DEXTROSE MONOHYDRATE 500 MG: 50 INJECTION, SOLUTION INTRAVENOUS at 05:34

## 2018-01-01 RX ADMIN — FENTANYL CITRATE 100 MCG: 50 INJECTION INTRAMUSCULAR; INTRAVENOUS at 20:13

## 2018-01-01 RX ADMIN — CHLORHEXIDINE GLUCONATE 15 ML: 1.2 RINSE ORAL at 21:42

## 2018-01-01 RX ADMIN — MAGNESIUM HYDROXIDE 30 ML: 400 SUSPENSION ORAL at 08:13

## 2018-01-01 RX ADMIN — PROPOFOL 40 MCG/KG/MIN: 10 INJECTION, EMULSION INTRAVENOUS at 11:10

## 2018-01-01 RX ADMIN — FAMOTIDINE 20 MG: 10 INJECTION, SOLUTION INTRAVENOUS at 20:24

## 2018-01-01 RX ADMIN — FENTANYL CITRATE 100 MCG: 50 INJECTION INTRAMUSCULAR; INTRAVENOUS at 14:20

## 2018-01-01 RX ADMIN — SODIUM CHLORIDE, POTASSIUM CHLORIDE, SODIUM LACTATE AND CALCIUM CHLORIDE 1000 ML: 600; 310; 30; 20 INJECTION, SOLUTION INTRAVENOUS at 21:00

## 2018-01-01 RX ADMIN — SODIUM CHLORIDE, POTASSIUM CHLORIDE, SODIUM LACTATE AND CALCIUM CHLORIDE 250 ML: 600; 310; 30; 20 INJECTION, SOLUTION INTRAVENOUS at 10:00

## 2018-01-01 RX ADMIN — ACETAMINOPHEN 650 MG: 325 TABLET, FILM COATED ORAL at 07:45

## 2018-01-01 RX ADMIN — FENTANYL CITRATE 100 MCG: 50 INJECTION INTRAMUSCULAR; INTRAVENOUS at 18:32

## 2018-01-01 RX ADMIN — ENALAPRILAT 1.25 MG: 1.25 INJECTION INTRAVENOUS at 05:34

## 2018-01-01 RX ADMIN — CEFEPIME 2 G: 2 INJECTION, POWDER, FOR SOLUTION INTRAMUSCULAR; INTRAVENOUS at 14:47

## 2018-01-01 RX ADMIN — FENTANYL CITRATE 100 MCG: 50 INJECTION INTRAMUSCULAR; INTRAVENOUS at 03:27

## 2018-01-01 RX ADMIN — SODIUM CHLORIDE, POTASSIUM CHLORIDE, SODIUM LACTATE AND CALCIUM CHLORIDE: 600; 310; 30; 20 INJECTION, SOLUTION INTRAVENOUS at 21:43

## 2018-01-01 RX ADMIN — FENTANYL CITRATE 100 MCG: 50 INJECTION INTRAMUSCULAR; INTRAVENOUS at 09:19

## 2018-01-01 RX ADMIN — SODIUM CHLORIDE 250 ML: 3 INJECTION, SOLUTION INTRAVENOUS at 15:14

## 2018-01-01 RX ADMIN — ACETAMINOPHEN 650 MG: 325 TABLET, FILM COATED ORAL at 15:10

## 2018-01-01 RX ADMIN — SODIUM CHLORIDE, POTASSIUM CHLORIDE, SODIUM LACTATE AND CALCIUM CHLORIDE: 600; 310; 30; 20 INJECTION, SOLUTION INTRAVENOUS at 03:27

## 2018-01-01 RX ADMIN — PROPOFOL 30 MCG/KG/MIN: 10 INJECTION, EMULSION INTRAVENOUS at 15:58

## 2018-01-01 RX ADMIN — SODIUM CHLORIDE 1000 ML: 9 INJECTION, SOLUTION INTRAVENOUS at 19:15

## 2018-01-01 RX ADMIN — POTASSIUM PHOSPHATE, MONOBASIC AND POTASSIUM PHOSPHATE, DIBASIC 30 MMOL: 224; 236 INJECTION, SOLUTION INTRAVENOUS at 11:12

## 2018-01-01 RX ADMIN — DOCUSATE SODIUM 100 MG: 50 LIQUID ORAL at 09:00

## 2018-01-01 RX ADMIN — CHLORHEXIDINE GLUCONATE 15 ML: 1.2 RINSE ORAL at 08:45

## 2018-01-01 RX ADMIN — DOCUSATE SODIUM 100 MG: 50 LIQUID ORAL at 20:21

## 2018-01-01 RX ADMIN — FENTANYL CITRATE 100 MCG: 50 INJECTION INTRAMUSCULAR; INTRAVENOUS at 02:07

## 2018-01-01 RX ADMIN — CHLORHEXIDINE GLUCONATE 15 ML: 1.2 RINSE ORAL at 20:13

## 2018-01-01 RX ADMIN — SODIUM CHLORIDE 500 ML: 3 INJECTION, SOLUTION INTRAVENOUS at 09:27

## 2018-01-01 RX ADMIN — HYDRALAZINE HYDROCHLORIDE 10 MG: 20 INJECTION INTRAMUSCULAR; INTRAVENOUS at 02:43

## 2018-01-01 RX ADMIN — CHLORHEXIDINE GLUCONATE 15 ML: 1.2 RINSE ORAL at 21:28

## 2018-01-01 RX ADMIN — CEFEPIME 2 G: 2 INJECTION, POWDER, FOR SOLUTION INTRAMUSCULAR; INTRAVENOUS at 13:11

## 2018-01-01 RX ADMIN — ACETAMINOPHEN 650 MG: 325 TABLET, FILM COATED ORAL at 17:29

## 2018-01-01 RX ADMIN — SODIUM CHLORIDE, POTASSIUM CHLORIDE, SODIUM LACTATE AND CALCIUM CHLORIDE 1000 ML: 600; 310; 30; 20 INJECTION, SOLUTION INTRAVENOUS at 02:00

## 2018-01-01 RX ADMIN — FENTANYL CITRATE 100 MCG: 50 INJECTION INTRAMUSCULAR; INTRAVENOUS at 01:14

## 2018-01-01 RX ADMIN — FENTANYL CITRATE 50 MCG: 50 INJECTION INTRAMUSCULAR; INTRAVENOUS at 23:55

## 2018-01-01 RX ADMIN — CHLORHEXIDINE GLUCONATE 15 ML: 1.2 RINSE ORAL at 22:04

## 2018-01-01 RX ADMIN — MAGNESIUM HYDROXIDE 30 ML: 400 SUSPENSION ORAL at 08:23

## 2018-01-01 RX ADMIN — CHLORHEXIDINE GLUCONATE 15 ML: 1.2 RINSE ORAL at 07:45

## 2018-01-01 RX ADMIN — Medication 100 MCG/HR: at 08:57

## 2018-01-01 RX ADMIN — PROPOFOL 20 MCG/KG/MIN: 10 INJECTION, EMULSION INTRAVENOUS at 11:38

## 2018-01-01 RX ADMIN — METOPROLOL TARTRATE 25 MG: 25 TABLET, FILM COATED ORAL at 10:50

## 2018-01-01 RX ADMIN — DEXTROSE MONOHYDRATE 500 MG: 50 INJECTION, SOLUTION INTRAVENOUS at 06:34

## 2018-01-01 RX ADMIN — FENTANYL CITRATE 100 MCG: 50 INJECTION INTRAMUSCULAR; INTRAVENOUS at 10:05

## 2018-01-01 RX ADMIN — FENTANYL CITRATE 100 MCG: 50 INJECTION INTRAMUSCULAR; INTRAVENOUS at 00:45

## 2018-01-01 RX ADMIN — OMEPRAZOLE 40 MG: 20 CAPSULE, DELAYED RELEASE ORAL at 09:49

## 2018-01-01 RX ADMIN — PROPOFOL 20 MCG/KG/MIN: 10 INJECTION, EMULSION INTRAVENOUS at 06:31

## 2018-01-01 RX ADMIN — FENTANYL CITRATE 100 MCG: 50 INJECTION INTRAMUSCULAR; INTRAVENOUS at 16:49

## 2018-01-01 RX ADMIN — DEXTROSE MONOHYDRATE 500 MG: 50 INJECTION, SOLUTION INTRAVENOUS at 12:00

## 2018-01-01 RX ADMIN — POLYETHYLENE GLYCOL 3350 1 PACKET: 17 POWDER, FOR SOLUTION ORAL at 21:32

## 2018-01-01 RX ADMIN — ACETAMINOPHEN 650 MG: 325 TABLET, FILM COATED ORAL at 05:34

## 2018-01-01 RX ADMIN — ACETAMINOPHEN 650 MG: 325 TABLET, FILM COATED ORAL at 11:57

## 2018-01-01 RX ADMIN — LINEZOLID 600 MG: 600 TABLET, FILM COATED ORAL at 08:24

## 2018-01-01 RX ADMIN — CHLORHEXIDINE GLUCONATE 15 ML: 1.2 RINSE ORAL at 10:50

## 2018-01-01 RX ADMIN — STANDARDIZED SENNA CONCENTRATE AND DOCUSATE SODIUM 1 TABLET: 8.6; 5 TABLET, FILM COATED ORAL at 22:05

## 2018-01-01 RX ADMIN — OMEPRAZOLE 40 MG: 20 CAPSULE, DELAYED RELEASE ORAL at 10:01

## 2018-01-01 RX ADMIN — FENTANYL CITRATE 100 MCG: 50 INJECTION INTRAMUSCULAR; INTRAVENOUS at 14:47

## 2018-01-01 RX ADMIN — MAGNESIUM SULFATE IN WATER 2 G: 40 INJECTION, SOLUTION INTRAVENOUS at 14:01

## 2018-01-01 RX ADMIN — METOPROLOL TARTRATE 25 MG: 25 TABLET, FILM COATED ORAL at 21:01

## 2018-01-01 RX ADMIN — PROPOFOL 40 MCG/KG/MIN: 10 INJECTION, EMULSION INTRAVENOUS at 13:59

## 2018-01-01 RX ADMIN — CEFEPIME 2 G: 2 INJECTION, POWDER, FOR SOLUTION INTRAMUSCULAR; INTRAVENOUS at 15:10

## 2018-01-01 RX ADMIN — LINEZOLID 600 MG: 600 TABLET, FILM COATED ORAL at 10:11

## 2018-01-01 RX ADMIN — AMLODIPINE BESYLATE 5 MG: 10 TABLET ORAL at 08:14

## 2018-01-01 RX ADMIN — CHLORHEXIDINE GLUCONATE 15 ML: 1.2 RINSE ORAL at 10:01

## 2018-01-01 RX ADMIN — VANCOMYCIN HYDROCHLORIDE 2100 MG: 100 INJECTION, POWDER, LYOPHILIZED, FOR SOLUTION INTRAVENOUS at 10:31

## 2018-01-01 RX ADMIN — FENTANYL CITRATE 100 MCG: 50 INJECTION INTRAMUSCULAR; INTRAVENOUS at 18:01

## 2018-01-01 RX ADMIN — PROPOFOL 20 MCG/KG/MIN: 10 INJECTION, EMULSION INTRAVENOUS at 18:06

## 2018-01-01 RX ADMIN — POTASSIUM PHOSPHATE, MONOBASIC AND POTASSIUM PHOSPHATE, DIBASIC 30 MMOL: 224; 236 INJECTION, SOLUTION INTRAVENOUS at 09:49

## 2018-01-01 RX ADMIN — SODIUM CHLORIDE 500 ML: 3 INJECTION, SOLUTION INTRAVENOUS at 22:56

## 2018-01-01 RX ADMIN — ENALAPRILAT 1.25 MG: 1.25 INJECTION INTRAVENOUS at 04:19

## 2018-01-01 RX ADMIN — PROPOFOL 40 MCG/KG/MIN: 10 INJECTION, EMULSION INTRAVENOUS at 16:43

## 2018-01-01 RX ADMIN — SODIUM CHLORIDE 500 ML: 3 INJECTION, SOLUTION INTRAVENOUS at 09:38

## 2018-01-01 RX ADMIN — SODIUM CHLORIDE, POTASSIUM CHLORIDE, SODIUM LACTATE AND CALCIUM CHLORIDE: 600; 310; 30; 20 INJECTION, SOLUTION INTRAVENOUS at 02:01

## 2018-01-01 RX ADMIN — ACETAMINOPHEN 650 MG: 325 TABLET, FILM COATED ORAL at 04:19

## 2018-01-01 RX ADMIN — OMEPRAZOLE 40 MG: 20 CAPSULE, DELAYED RELEASE ORAL at 08:10

## 2018-01-01 RX ADMIN — POLYETHYLENE GLYCOL 3350 1 PACKET: 17 POWDER, FOR SOLUTION ORAL at 10:01

## 2018-01-01 RX ADMIN — DEXTROSE MONOHYDRATE 500 MG: 50 INJECTION, SOLUTION INTRAVENOUS at 18:27

## 2018-01-01 RX ADMIN — CEFEPIME 2 G: 2 INJECTION, POWDER, FOR SOLUTION INTRAMUSCULAR; INTRAVENOUS at 06:11

## 2018-01-01 RX ADMIN — DEXTROSE MONOHYDRATE 500 MG: 50 INJECTION, SOLUTION INTRAVENOUS at 17:52

## 2018-01-01 RX ADMIN — SODIUM CHLORIDE, POTASSIUM CHLORIDE, SODIUM LACTATE AND CALCIUM CHLORIDE: 600; 310; 30; 20 INJECTION, SOLUTION INTRAVENOUS at 07:32

## 2018-01-01 RX ADMIN — DOCUSATE SODIUM 100 MG: 50 LIQUID ORAL at 10:49

## 2018-01-01 RX ADMIN — SODIUM CHLORIDE, POTASSIUM CHLORIDE, SODIUM LACTATE AND CALCIUM CHLORIDE: 600; 310; 30; 20 INJECTION, SOLUTION INTRAVENOUS at 13:00

## 2018-01-01 RX ADMIN — DEXTROSE MONOHYDRATE 500 MG: 50 INJECTION, SOLUTION INTRAVENOUS at 05:53

## 2018-01-01 RX ADMIN — DOCUSATE SODIUM 100 MG: 50 LIQUID ORAL at 22:04

## 2018-01-01 RX ADMIN — SODIUM CHLORIDE, POTASSIUM CHLORIDE, SODIUM LACTATE AND CALCIUM CHLORIDE: 600; 310; 30; 20 INJECTION, SOLUTION INTRAVENOUS at 14:51

## 2018-01-01 RX ADMIN — ACETAMINOPHEN 650 MG: 325 TABLET, FILM COATED ORAL at 19:30

## 2018-01-01 RX ADMIN — PROPOFOL 30 MCG/KG/MIN: 10 INJECTION, EMULSION INTRAVENOUS at 16:57

## 2018-01-01 RX ADMIN — SODIUM CHLORIDE, POTASSIUM CHLORIDE, SODIUM LACTATE AND CALCIUM CHLORIDE: 600; 310; 30; 20 INJECTION, SOLUTION INTRAVENOUS at 23:26

## 2018-01-01 RX ADMIN — SODIUM CHLORIDE, POTASSIUM CHLORIDE, SODIUM LACTATE AND CALCIUM CHLORIDE: 600; 310; 30; 20 INJECTION, SOLUTION INTRAVENOUS at 15:41

## 2018-01-01 RX ADMIN — MAGNESIUM SULFATE IN WATER 4 G: 40 INJECTION, SOLUTION INTRAVENOUS at 11:48

## 2018-01-01 RX ADMIN — CHLORHEXIDINE GLUCONATE 15 ML: 1.2 RINSE ORAL at 19:41

## 2018-01-01 RX ADMIN — FENTANYL CITRATE 100 MCG: 50 INJECTION INTRAMUSCULAR; INTRAVENOUS at 07:37

## 2018-01-01 RX ADMIN — PROPOFOL 20 MCG/KG/MIN: 10 INJECTION, EMULSION INTRAVENOUS at 05:16

## 2018-01-01 RX ADMIN — Medication 100 MCG/HR: at 10:17

## 2018-01-01 RX ADMIN — CHLORHEXIDINE GLUCONATE 15 ML: 1.2 RINSE ORAL at 20:24

## 2018-01-01 RX ADMIN — POTASSIUM PHOSPHATE, MONOBASIC AND POTASSIUM PHOSPHATE, DIBASIC 15 MMOL: 224; 236 INJECTION, SOLUTION INTRAVENOUS at 23:55

## 2018-01-01 RX ADMIN — LIDOCAINE HYDROCHLORIDE 50 MG: 20 INJECTION, SOLUTION INTRAVENOUS at 12:51

## 2018-01-01 RX ADMIN — STANDARDIZED SENNA CONCENTRATE AND DOCUSATE SODIUM 1 TABLET: 8.6; 5 TABLET, FILM COATED ORAL at 20:03

## 2018-01-01 RX ADMIN — STANDARDIZED SENNA CONCENTRATE AND DOCUSATE SODIUM 1 TABLET: 8.6; 5 TABLET, FILM COATED ORAL at 21:32

## 2018-01-01 RX ADMIN — POLYETHYLENE GLYCOL 3350 1 PACKET: 17 POWDER, FOR SOLUTION ORAL at 21:28

## 2018-01-01 RX ADMIN — METOPROLOL TARTRATE 25 MG: 25 TABLET, FILM COATED ORAL at 08:11

## 2018-01-01 RX ADMIN — POTASSIUM PHOSPHATE, MONOBASIC AND POTASSIUM PHOSPHATE, DIBASIC 30 MMOL: 224; 236 INJECTION, SOLUTION INTRAVENOUS at 08:45

## 2018-01-01 RX ADMIN — PROPOFOL 10 MCG/KG/MIN: 10 INJECTION, EMULSION INTRAVENOUS at 16:40

## 2018-01-01 RX ADMIN — FENTANYL CITRATE 100 MCG: 50 INJECTION INTRAMUSCULAR; INTRAVENOUS at 18:05

## 2018-01-01 RX ADMIN — SODIUM CHLORIDE, POTASSIUM CHLORIDE, SODIUM LACTATE AND CALCIUM CHLORIDE: 600; 310; 30; 20 INJECTION, SOLUTION INTRAVENOUS at 23:57

## 2018-01-01 RX ADMIN — CHLORHEXIDINE GLUCONATE 15 ML: 1.2 RINSE ORAL at 09:49

## 2018-01-01 RX ADMIN — PROPOFOL 30 MCG/KG/MIN: 10 INJECTION, EMULSION INTRAVENOUS at 18:09

## 2018-01-01 RX ADMIN — DEXTROSE MONOHYDRATE 500 MG: 50 INJECTION, SOLUTION INTRAVENOUS at 06:05

## 2018-01-01 RX ADMIN — CEFEPIME 2 G: 2 INJECTION, POWDER, FOR SOLUTION INTRAMUSCULAR; INTRAVENOUS at 21:27

## 2018-01-01 RX ADMIN — MANNITOL 50 G: 250 INJECTION, SOLUTION INTRAVENOUS at 15:00

## 2018-01-01 RX ADMIN — CEFEPIME 2 G: 2 INJECTION, POWDER, FOR SOLUTION INTRAMUSCULAR; INTRAVENOUS at 21:01

## 2018-01-01 RX ADMIN — METOPROLOL TARTRATE 25 MG: 25 TABLET, FILM COATED ORAL at 08:23

## 2018-01-01 RX ADMIN — METOPROLOL TARTRATE 25 MG: 25 TABLET, FILM COATED ORAL at 11:25

## 2018-01-01 RX ADMIN — SODIUM CHLORIDE, POTASSIUM CHLORIDE, SODIUM LACTATE AND CALCIUM CHLORIDE: 600; 310; 30; 20 INJECTION, SOLUTION INTRAVENOUS at 18:14

## 2018-01-01 RX ADMIN — PROPOFOL 40 MCG/KG/MIN: 10 INJECTION, EMULSION INTRAVENOUS at 21:26

## 2018-01-01 RX ADMIN — SODIUM CHLORIDE 500 ML: 234 INJECTION, SOLUTION INTRAVENOUS at 20:04

## 2018-01-01 RX ADMIN — OMEPRAZOLE 40 MG: 20 CAPSULE, DELAYED RELEASE ORAL at 08:22

## 2018-01-01 RX ADMIN — MAGNESIUM SULFATE IN WATER 2 G: 40 INJECTION, SOLUTION INTRAVENOUS at 10:01

## 2018-01-01 RX ADMIN — METOPROLOL TARTRATE 25 MG: 25 TABLET, FILM COATED ORAL at 22:04

## 2018-01-01 RX ADMIN — FENTANYL CITRATE 100 MCG: 50 INJECTION INTRAMUSCULAR; INTRAVENOUS at 05:16

## 2018-01-01 RX ADMIN — SODIUM CHLORIDE, POTASSIUM CHLORIDE, SODIUM LACTATE AND CALCIUM CHLORIDE: 600; 310; 30; 20 INJECTION, SOLUTION INTRAVENOUS at 18:29

## 2018-01-01 RX ADMIN — SUCCINYLCHOLINE CHLORIDE 100 MG: 20 INJECTION, SOLUTION INTRAMUSCULAR; INTRAVENOUS at 12:52

## 2018-01-01 RX ADMIN — POLYETHYLENE GLYCOL 3350 1 PACKET: 17 POWDER, FOR SOLUTION ORAL at 20:13

## 2018-01-01 RX ADMIN — CHLORHEXIDINE GLUCONATE 15 ML: 1.2 RINSE ORAL at 08:11

## 2018-01-01 RX ADMIN — FAMOTIDINE 20 MG: 10 INJECTION, SOLUTION INTRAVENOUS at 20:04

## 2018-01-01 RX ADMIN — SODIUM CHLORIDE 500 ML: 3 INJECTION, SOLUTION INTRAVENOUS at 20:22

## 2018-01-01 RX ADMIN — PROPOFOL 40 MCG/KG/MIN: 10 INJECTION, EMULSION INTRAVENOUS at 06:34

## 2018-01-01 RX ADMIN — SODIUM CHLORIDE, POTASSIUM CHLORIDE, SODIUM LACTATE AND CALCIUM CHLORIDE: 600; 310; 30; 20 INJECTION, SOLUTION INTRAVENOUS at 08:46

## 2018-01-01 RX ADMIN — PROPOFOL 40 MCG/KG/MIN: 10 INJECTION, EMULSION INTRAVENOUS at 22:05

## 2018-01-01 RX ADMIN — ENALAPRILAT 1.25 MG: 1.25 INJECTION INTRAVENOUS at 15:09

## 2018-01-01 RX ADMIN — FENTANYL CITRATE 100 MCG: 50 INJECTION INTRAMUSCULAR; INTRAVENOUS at 07:10

## 2018-01-01 RX ADMIN — MAGNESIUM SULFATE IN WATER 2 G: 40 INJECTION, SOLUTION INTRAVENOUS at 22:16

## 2018-01-01 RX ADMIN — FENTANYL CITRATE 100 MCG: 50 INJECTION INTRAMUSCULAR; INTRAVENOUS at 02:11

## 2018-01-01 RX ADMIN — Medication: at 00:56

## 2018-01-01 RX ADMIN — SODIUM CHLORIDE, POTASSIUM CHLORIDE, SODIUM LACTATE AND CALCIUM CHLORIDE: 600; 310; 30; 20 INJECTION, SOLUTION INTRAVENOUS at 12:00

## 2018-01-01 RX ADMIN — FENTANYL CITRATE 100 MCG: 50 INJECTION INTRAMUSCULAR; INTRAVENOUS at 18:39

## 2018-01-01 RX ADMIN — PROPOFOL 30 MCG/KG/MIN: 10 INJECTION, EMULSION INTRAVENOUS at 12:00

## 2018-01-01 RX ADMIN — DEXTROSE MONOHYDRATE 500 MG: 50 INJECTION, SOLUTION INTRAVENOUS at 18:08

## 2018-01-01 RX ADMIN — METOPROLOL TARTRATE 25 MG: 25 TABLET, FILM COATED ORAL at 21:42

## 2018-01-01 RX ADMIN — OMEPRAZOLE 40 MG: 20 CAPSULE, DELAYED RELEASE ORAL at 07:36

## 2018-01-01 RX ADMIN — DOCUSATE SODIUM 100 MG: 50 LIQUID ORAL at 08:11

## 2018-01-01 RX ADMIN — FENTANYL CITRATE 100 MCG: 50 INJECTION INTRAMUSCULAR; INTRAVENOUS at 08:44

## 2018-01-01 RX ADMIN — CHLORHEXIDINE GLUCONATE 15 ML: 1.2 RINSE ORAL at 21:01

## 2018-01-01 RX ADMIN — CEFEPIME 2 G: 2 INJECTION, POWDER, FOR SOLUTION INTRAMUSCULAR; INTRAVENOUS at 05:43

## 2018-01-01 RX ADMIN — MAGNESIUM HYDROXIDE 30 ML: 400 SUSPENSION ORAL at 08:10

## 2018-01-01 RX ADMIN — Medication 100 MCG/HR: at 07:37

## 2018-01-01 RX ADMIN — SODIUM CHLORIDE, POTASSIUM CHLORIDE, SODIUM LACTATE AND CALCIUM CHLORIDE: 600; 310; 30; 20 INJECTION, SOLUTION INTRAVENOUS at 16:43

## 2018-01-01 RX ADMIN — DEXTROSE MONOHYDRATE 500 MG: 50 INJECTION, SOLUTION INTRAVENOUS at 05:49

## 2018-01-01 RX ADMIN — FAMOTIDINE 20 MG: 10 INJECTION, SOLUTION INTRAVENOUS at 09:44

## 2018-01-01 RX ADMIN — LINEZOLID 600 MG: 600 TABLET, FILM COATED ORAL at 21:27

## 2018-01-01 RX ADMIN — ETOMIDATE 20 MG: 2 INJECTION INTRAVENOUS at 12:52

## 2018-01-01 RX ADMIN — FENTANYL CITRATE 100 MCG: 50 INJECTION INTRAMUSCULAR; INTRAVENOUS at 08:10

## 2018-01-01 RX ADMIN — ENALAPRILAT 1.25 MG: 1.25 INJECTION INTRAVENOUS at 04:15

## 2018-01-01 RX ADMIN — AMLODIPINE BESYLATE 5 MG: 10 TABLET ORAL at 11:25

## 2018-01-01 RX ADMIN — DOCUSATE SODIUM 100 MG: 50 LIQUID ORAL at 20:03

## 2018-01-01 RX ADMIN — PROPOFOL 20 MCG/KG/MIN: 10 INJECTION, EMULSION INTRAVENOUS at 04:17

## 2018-01-01 RX ADMIN — FENTANYL CITRATE 100 MCG: 50 INJECTION INTRAMUSCULAR; INTRAVENOUS at 15:58

## 2018-01-01 RX ADMIN — CHLORHEXIDINE GLUCONATE 15 ML: 1.2 RINSE ORAL at 08:24

## 2018-01-01 RX ADMIN — FENTANYL CITRATE 100 MCG: 50 INJECTION INTRAMUSCULAR; INTRAVENOUS at 12:10

## 2018-01-01 RX ADMIN — CHLORHEXIDINE GLUCONATE 15 ML: 1.2 RINSE ORAL at 08:23

## 2018-01-01 RX ADMIN — POTASSIUM CHLORIDE 20 MEQ: 2 INJECTION, SOLUTION, CONCENTRATE INTRAVENOUS at 10:55

## 2018-01-01 RX ADMIN — STANDARDIZED SENNA CONCENTRATE AND DOCUSATE SODIUM 1 TABLET: 8.6; 5 TABLET, FILM COATED ORAL at 21:27

## 2018-01-01 RX ADMIN — AMLODIPINE BESYLATE 5 MG: 10 TABLET ORAL at 10:50

## 2018-01-01 RX ADMIN — SODIUM CHLORIDE 500 ML: 234 INJECTION, SOLUTION INTRAVENOUS at 06:56

## 2018-01-01 RX ADMIN — FENTANYL CITRATE 50 MCG: 50 INJECTION INTRAMUSCULAR; INTRAVENOUS at 22:39

## 2018-01-01 RX ADMIN — PROPOFOL 40 MCG/KG/MIN: 10 INJECTION, EMULSION INTRAVENOUS at 01:14

## 2018-01-01 RX ADMIN — DEXTROSE MONOHYDRATE 500 MG: 50 INJECTION, SOLUTION INTRAVENOUS at 17:39

## 2018-01-01 RX ADMIN — OMEPRAZOLE 40 MG: 20 CAPSULE, DELAYED RELEASE ORAL at 10:50

## 2018-01-01 RX ADMIN — MANNITOL 50 G: 12.5 INJECTION, SOLUTION INTRAVENOUS at 15:00

## 2018-01-01 RX ADMIN — OMEPRAZOLE 40 MG: 20 CAPSULE, DELAYED RELEASE ORAL at 08:24

## 2018-01-01 RX ADMIN — ACETAMINOPHEN 650 MG: 325 TABLET, FILM COATED ORAL at 15:17

## 2018-01-01 RX ADMIN — DEXTROSE MONOHYDRATE 500 MG: 50 INJECTION, SOLUTION INTRAVENOUS at 19:13

## 2018-01-01 RX ADMIN — OMEPRAZOLE 40 MG: 20 CAPSULE, DELAYED RELEASE ORAL at 08:44

## 2018-01-01 RX ADMIN — CHLORHEXIDINE GLUCONATE 15 ML: 1.2 RINSE ORAL at 20:17

## 2018-01-01 RX ADMIN — POTASSIUM PHOSPHATE, MONOBASIC AND POTASSIUM PHOSPHATE, DIBASIC 30 MMOL: 224; 236 INJECTION, SOLUTION INTRAVENOUS at 11:48

## 2018-01-01 RX ADMIN — DOCUSATE SODIUM 100 MG: 50 LIQUID ORAL at 21:28

## 2018-01-01 RX ADMIN — DEXTROSE MONOHYDRATE 500 MG: 50 INJECTION, SOLUTION INTRAVENOUS at 18:16

## 2018-01-01 RX ADMIN — HYDRALAZINE HYDROCHLORIDE 20 MG: 20 INJECTION INTRAMUSCULAR; INTRAVENOUS at 00:11

## 2018-01-01 RX ADMIN — AMLODIPINE BESYLATE 5 MG: 10 TABLET ORAL at 08:23

## 2018-01-01 RX ADMIN — FENTANYL CITRATE 100 MCG: 50 INJECTION INTRAMUSCULAR; INTRAVENOUS at 15:33

## 2018-01-01 RX ADMIN — FENTANYL CITRATE 100 MCG: 50 INJECTION INTRAMUSCULAR; INTRAVENOUS at 00:08

## 2018-01-01 RX ADMIN — METOPROLOL TARTRATE 25 MG: 25 TABLET, FILM COATED ORAL at 08:13

## 2018-01-01 RX ADMIN — OMEPRAZOLE 40 MG: 20 CAPSULE, DELAYED RELEASE ORAL at 07:45

## 2018-01-01 RX ADMIN — ENALAPRILAT 1.25 MG: 1.25 INJECTION INTRAVENOUS at 12:58

## 2018-01-01 RX ADMIN — CHLORHEXIDINE GLUCONATE 15 ML: 1.2 RINSE ORAL at 21:31

## 2018-01-01 RX ADMIN — CHLORHEXIDINE GLUCONATE 15 ML: 1.2 RINSE ORAL at 07:36

## 2018-01-01 RX ADMIN — LINEZOLID 600 MG: 600 TABLET, FILM COATED ORAL at 21:00

## 2018-01-01 RX ADMIN — CHLORHEXIDINE GLUCONATE 15 ML: 1.2 RINSE ORAL at 09:45

## 2018-01-01 RX ADMIN — DOCUSATE SODIUM 100 MG: 50 LIQUID ORAL at 21:32

## 2018-01-01 RX ADMIN — PROPOFOL 50 MCG/KG/MIN: 10 INJECTION, EMULSION INTRAVENOUS at 09:38

## 2018-01-01 RX ADMIN — MAGNESIUM HYDROXIDE 30 ML: 400 SUSPENSION ORAL at 10:01

## 2018-01-01 RX ADMIN — CHLORHEXIDINE GLUCONATE 15 ML: 1.2 RINSE ORAL at 08:13

## 2018-01-01 ASSESSMENT — LIFESTYLE VARIABLES
REASON UNABLE TO ASSESS: PT INTUBATED AND SEDATED
REASON UNABLE TO ASSESS: INTUBATED AND SEDATED
REASON UNABLE TO ASSESS: PT INTUBATED

## 2018-01-29 PROBLEM — K70.30 CIRRHOSIS WITH ALCOHOLISM (HCC): Status: ACTIVE | Noted: 2018-01-01

## 2018-01-29 PROBLEM — F10.10 ALCOHOL ABUSE: Chronic | Status: ACTIVE | Noted: 2018-01-01

## 2018-01-29 PROBLEM — S06.5X9A SUBDURAL HEMATOMA WITHOUT COMA, WITH LOC OF UNSPECIFIED DURATION, INITIAL ENCOUNTER (HCC): Status: ACTIVE | Noted: 2018-01-01

## 2018-01-29 PROBLEM — D69.59 THROMBOCYTOPENIA CONCURRENT WITH AND DUE TO ALCOHOLISM (HCC): Status: ACTIVE | Noted: 2018-01-01

## 2018-01-29 PROBLEM — J95.821 ACUTE RESPIRATORY FAILURE FOLLOWING TRAUMA AND SURGERY (HCC): Status: ACTIVE | Noted: 2018-01-01

## 2018-01-29 PROBLEM — F10.20 THROMBOCYTOPENIA CONCURRENT WITH AND DUE TO ALCOHOLISM (HCC): Status: ACTIVE | Noted: 2018-01-01

## 2018-01-29 PROBLEM — R79.1 ELEVATED INR: Status: ACTIVE | Noted: 2018-01-01
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
## 2018-01-29 NOTE — ED NOTES
Placed call to pts home pharmacy.  No recent prescriptions filled.  Last fill 11/2017.  Unable to interview pt at this time.

## 2018-01-29 NOTE — DISCHARGE PLANNING
SW escorted pts family to the preop waiting room where they were escorted to pts bedside. SW to remain available.

## 2018-01-29 NOTE — OR NURSING
Pt arrived to pre op holding. Armbands verified. Anesthesia and surgical consents signed by pt's mother, surgeon and anesthesiologist. Pt taken to OR quickly with OR staff. Full pre op assessment unable to be completed.

## 2018-01-29 NOTE — CARE PLAN
Problem: Ventilation Defect:  Goal: Ability to achieve and maintain unassisted ventilation or tolerate decreased levels of ventilator support    Intervention: Support and monitor invasive and noninvasive mechanical ventilation  Intubation Assist    Intubation assist performed   Reason for intubation airway protection, no respiratory effort  Positive Color Change on EZCap? yes  Difficult Intubation/Number of attempts yes/2  Evidence of aspiration no  Airway Group ET Tube Oral-Secured At  (cm): 22 (01/29/18 1302)  Manriquez Vent Mode: (S)CMV (01/29/18 1254)     Rate (breaths/min): 16 (01/29/18 1254)  Vt Target (mL): 440 (01/29/18 1254)  FiO2: 50 (01/29/18 1254)  PEEP/CPAP: 8 (01/29/18 1254)    Events:  Pt. Intubated for airway protection for respiratory arrest, ERP will notify PMA.

## 2018-01-29 NOTE — ED NOTES
Tech from Lab called with critical result of platelet 41 at 1406. Critical lab result read back to tech.   Dr. Mathis notified of critical lab result at 1407.  Critical lab result read back by Dr. Mathis.
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
## 2018-01-30 NOTE — OP REPORT
Date: 1/29/2018  Surgeon:Lino Reyes  Anesthesia: GETA    Preoperative diagnosis  1. Traumatic right subdural hematoma  2. Traumatic right temporal intraparenchymal hemorrhage  3. Coagulopathy  4. Thrombocytopenia  5. Comatose  6. Cerebral herniation    Postoperative diagnosis  1. Traumatic right subdural hematoma  2. Traumatic right temporal intraparenchymal hemorrhage  3. Coagulopathy  4. Thrombocytopenia  5. Comatose  6. Cerebral herniation    Procedures performed  1. Right hemicraniectomy for evacuation of right traumatic subdural hematoma  2. Right temporal intra-cerebral, intraparenchymal hemorrhage evacuation  3. Microscope and microscopic dissection  4. Modifier 22-Due to the patient's    alcoholic cirrhosis, thrombocytopenia and coagulopathy, there was as well as significant time spent resecting the right temporal intraparenchymal, intracranial hemorrhage and evacuation of subdural hematoma decided one and half hours of surgical time, the expected time for this type of surgery without these major medical comorbidities contributing    Indication for procedure  This is a 32-year-old female who was brought into the emergency room as a GCS of 4. Her head CT showed a very large acute right subdural hematoma with greater than 1.8 cm of right-to-left midline shift, focal herniation, cerebellar tonsillar herniation, Trapping of the right temporal horn. She also had a 2.8 cm x 2.8 cm right temporal intraparenchymal/intracerebral hemorrhage with significant mass effect. When she was initially seen in the emergency room, she was noted to be a GCS of 4 with extension of her right upper extremity and some movement of her left upper extremity as well. Her right pupil was 7 mm nonreactive with no corneal reflex she did have a cough and gag. By the time that surgery started, her pupils were bilaterally dilated to 7 mm and nonreactive. I discussed the surgical procedure with her parents and due to the severity of her  traumatic brain injury and cerebral herniation, they elected to proceed with surgical decompression and evacuation of hematoma, even with the understanding that this was a very serious and possibly lethal traumatic brain injury. They understood the risks, benefits, alternatives procedure and wished to proceed      Procedure in detail  Patient was brought to the operating room and placed supine on the regular or table. Her head was turned with the right side up. The right side of her head shaved. Her skin was marked in the midline with a large horseshoe type frontotemporal, parietal craniectomy flap. She was prepped and draped in usual sterile fashion. She was clearly coagulopathic and thrombocytopenic. She was bleeding extensively from her right central line placement as well as 2 very small next from the hair clipping. A preprocedure timeout was performed. Her skin was infiltrated with half percent Marcaine with epinephrine. A 10 blade was used to sharply incise the scalp through the galea down to the periosteum over the entirety of the reverse horseshoe flap. Miley clips were used for hemostasis. She was noted to be diffusely coagulopathic and oozing from all cut surfaces.  The Miley clips did help with this. A myocutaneous flap was elevated, bringing the temporalis muscle anteriorly with the scalp flap. This was held anteriorly with perforating towel clamps, rubber band and an Allis. The posterior and anterior zygomas were noted. A  was used to create 2 bur holes, one in the low right middle cranial fossa, the other in the anterior, frontal bone, by the pterion. A craniotome was used to elevate the cranial flap. The flap was elevated off the underlying dura with a 3 Penfield. There was minor oozing noted from the midline bridging veins and this was easily dealt with, with Gelfoam. The dura was opened and the blood clot laying on the right hemisphere was noted to be quite liquid. This was removed with  combination of suction and irrigation. The brain surface was noted to be diffusely oozing. There was small peel hemorrhaging vessels over the surface of the right temporal cortex, middle and inferior temporal gyri. Significant time was spent on hemostasis in this area with bipolar electrocautery. The surface of the right temporal lobe was noted to be diffusely hemorrhagic and the bipolar was unable to control the hemorrhaging. Because of this, I did use the microscope and performed a right temporal hematoma evacuation and attempts to try to get to more normal tissue in order to stop the bleeding. I was able to eventually do this and completely successfully. This portion alone added well over 1-1/2 hours to the surgical time, easily doubling the time needed for this surgery. His was in large part due to her coagulopathy and thrombocytopenia. She was getting transfused PRBC, FFP, platelets and this did help her overall intraoperative and clinical coagulopathy. Her blood was still quite thin and oozy.  A layer of Surgicel was laid in the intraparenchymal hemorrhage cavity after hemostasis had been achieved. Attention was then turned to the posterior temporal subdural hematoma that still remained. While I was removing this with irrigation and a Penfield 3, the clot did dislodge.  When this happened, there is noted to be severe and brisk venous bleeding, directly in the region of the right vein of Joy.  Good bit of Gelfoam to help stop the bleeding. Eventually, the bleeding did stop. At this point, intracranial hemostasis had been achieved. Epidural hemostasis was ensured from the entry and this was rechecked and Surgi-Lei was placed again in the epidural space around the craniotomy site. The craniotomy site was irrigated and the irrigated returned clear. DuraMatrix was laid over the craniectomy site. A PERRY drain was placed in the epidural space, tunneled through a separate stab incision and affixed to the skin. The  wound was closed in layers with staples applied to the skin edges. A sterile dressing was applied. The patient was taken back to the ICU in critical condition    Estimated blood loss  1 L    Complications  None noted

## 2018-01-30 NOTE — NON-PROVIDER
Patient Summary: The patient is a 32 year-old female ICU/hospital day #2 after presenting to the ER yesterday afternoon after becoming acutely unresponsive after getting into her car while running errands with her boyfriend.  She was taken home and EMS called to bring her to the ED.  PMH significant for EtOH abuse and cirrhosis; no known history of recent trauma/falls. She was a GCS of 4 upon arrival.  Heat CT revealed a right sided 2.5 cm subdural hematoma, 2.8 intra-axial right temporal hematoma, and 2.1 cm right to left shift.   Severe coagulapathy was discovered with elevated INR of 1.67 and thrombocytopenia with platelet count of 41. Patient was taken for craniectomy for evacuation by Dr. Reyes where she received Factor 7, rRBC, FFP, and platelets.  She has been stable in ICU since surgery.    24 Hour Events:  Hospital/ICU day #2 for right sided subdural hematoma, intra-axial right temporal hematoma, and right to left shift.     POD#1 s/p right hemicraniectomy for subdural hematoma evacuation, evacuation of intraparenchymal hemorrhage.  CT-CTA head this AM shows an intraventricular hemorrhage is now present in the left lateral ventricle.  Significant interval improvement of right to left midline shift and no intracranial aneurysm.    FFP, pRBC, and PLT have been administered to correct coagulopathy and thombocytopenia associated with EtOH abuse and cirrhosis.  Currently running platelets.    Overnight, Rhogam was given as blood blank did not have Rh negative platelets available.  Rh+ platelet products received without complication.       SUBJECTIVE/OBJECTIVE:  NEURO:  GCS  24hr: GCS of 4 at admission.     Current: GCS of 5 (E1, V1, M3)   Exam Comatose.  Fixed left pupillary response at 3-4 mm.  Right eye very swollen, and right pupil fixed at 7 mm.  Decorticate posturing and withdrawal both witness with painful stimulus.  Some spontaneous movement of extremities.  Gag and corneal reflex intact.  Craniectomy  dressings are clean and minimal blood seen around the sites.     Meds/Pain Keppra for seizure prophylaxis, Propofol 40 micrograms,kg/min at 13.6 ml/hr.  Fentanyl PRN pain   Labs See heme    Imaging CT-CT HEAD W/ AND W/O FROM 01/30 AT 6:42 am  1.  No intracranial aneurysm, focal stenosis or abrupt large vessel cut off.  2.  Postoperative changes again seen from RIGHT frontal parietal craniectomy and evacuation of subdural hematoma with RIGHT temporal intraparenchymal hemorrhage again noted.  3.  Intraventricular hemorrhage now present in the LEFT lateral ventricle.  4.  Significant interval improvement of RIGHT to LEFT midline shift.    CT HEAD W/O FROM 01/29/18 2:00 pm  1.  Massive right subdural hematoma measuring 2.5 cm in thickness    2.  Additionally, 2.8 cm intra-axial right temporal hematoma and small amount of subarachnoid hemorrhage within the right sylvian fissure    3.  Right to left shift measuring 2.1 cm with associated subfalcine herniation. Posterior fossa mass effect with partial effacement of the basilar cisterns and compression of the brainstem    4.  Given lack of history of trauma ruptured aneurysm or AVM is a possibility and CTA head may be indicated if clinically appropriate     RESP:  RR, SpO2 RR 14-16. SpO2 100% on ventilation.   Vent Vent Day #2. Invasive and noninvasive mechanical ventilation . APVCMV mode. Vent rate 14. . PEEP 8. 30% O2   Exam Symmetrical air movement.  CTAB   Meds NA   Labs NA                                                    Imaging 01/30 AM CXR: no new findings  -New right IJV central line tip projects in satisfactory position. No pneumothorax.  -Endotracheal tube and nasogastric catheter appear appropriately located     CV:  HR/BP/MAP/  CVP HR 60s-70s.  SBP 120s-165. DBP 40s-60s.     Exam RRR. No murmurs, rubs, or gallops.  2+ pulses in extremities.  No JVD.   Meds Hydralazine q6 PRN for SBP > 150.  Given overnight.     Labs EKG shows sinus bradycardia at rate of  44 bpm with nonspecific T abnormalities in the anterior-lateral leads.  No evidence of infarct or ischemia.     Imaging NA     GI:  Diet/Bowel Function NPO.  Lee in place.     Exam Decreased bowel sounds.  No TTP, guarding noted.  No hepatogemaly.     Labs Recent Labs      01/29/18   1255  01/29/18   1825  01/29/18 2315 01/30/18   0538   ALBUMIN  4.3  3.1*   --   3.2   TBILIRUBIN  3.2*  2.4*   --   5.5*   ALKPHOSPHAT  110*  55   --   55   TOTPROTEIN  7.4  5.0*   --   5.1*   ALTSGPT  36  21   --   23   ASTSGOT  156*  73*   --   72*   CREATININE  0.41*  0.38*  0.43*  0.38*      Imaging NA     F/E/N-:  I/O  IV fluid: 3% NaCl at 30 ml/hour.  LR at 100 ml/hr.    Intake/Output Summary (Last 24 hours) at 01/30/18 0800  Last data filed at 01/30/18 0700   Gross per 24 hour   Intake         65006.03 ml   Output             3685 ml   Net          7547.03 ml      Exam Urine is clear with slight pink hue.     Meds NA   Labs Recent Labs      01/29/18   1825 01/29/18 2315  01/30/18   0538   SODIUM  144  146*  146*   POTASSIUM  3.9  3.9  3.7   CHLORIDE  111  113*  114*   CO2  18*  18*  18*   GLUCOSE  135*  124*  110*   BUN  4*  4*  5*   Phosphorous: 3.3. Magnesium of 1.4   Nutrition Currently on IVF only.         HEME:  Labs Recent Labs      01/29/18   1255  01/29/18   1825 01/29/18 2315 01/30/18   0538   WBC  7.6  7.4  9.4  6.9   RBC  3.46*  2.34*  2.32*  2.27*   HEMOGLOBIN  8.7*  6.8*  6.8*  6.5*   HEMATOCRIT  28.8*  20.6*  20.2*  19.4*   MCV  83.2  89.3  87.1  85.5   MCH  25.1*  28.2  29.3  28.6   RDW  69.0*  62.8*  56.5*  53.5*   PLATELETCT  41*  53*  39*  27*   MPV   --   11.4  12.4   --    NEUTSPOLYS  66.90  90.70*   --   90.60*   LYMPHOCYTES  27.00  5.30*   --   3.50*   MONOCYTES  3.50  3.40   --   5.40   EOSINOPHILS  0.90  0.10   --   0.00   BASOPHILS  1.70  0.10   --   0.10   RBCMORPHOLO  Present   --    --    --      Component Value Ref Range & Units Status   Reaction Time Initial-R 6.2  5.0 - 10.0 min Final    Clot Kinetics-K 3.2   1.0 - 3.0 min Final   Clot Angle-Angle 58.5  53.0 - 72.0 degrees Final   Maximum Clot Strength-MA 42.6   50.0 - 70.0 mm Final   Lysis 30 minutes-LY30 0.0  0.0 - 8.0 % Final   % Inhibition ADP 65.3  % Final   % Inhibition AA 97.4  % Final        Transfusions PLT, RBCs, FFP. Given Rhogam.   Imaging NA     ID:  Temp  24hr: 94.1F at admin. Currently 98.4F   Tmax: 100F   Labs WBC of 6.9. Neutrophils of 90.60   Micro NA   ABX NA     ENDOCRINE:  Blood Sugar 110 with range of 118-147.   Meds Regular Insulin held as per BGL parameters.     MUSCULOSKELETAL:  Imaging CT-CSpine   Impression       1.  No cervical spine fracture or subluxation.  2.  Subpleural gas at the RIGHT lung apex with possible tiny associated pneumothorax.  Follow-up recommended.      WB Status Non-WB.     SKIN:  Exam Multiple small ecchymosis in all extremities.   Interventions Not indicated at this time.       ASSESSMENT/PLAN:  NEURO: CT of head did not show evidence of intracranial aneurysm with improvement of midline shift since evacuation procedure.  Continue Fentanyl for pain, Propofol for sedation, and Keppra for seizure prophylaxis. Neurosurgery assessed and prognosis is poor.       RESP: Ventilation day #2 and respiratory status stable.  No evidence of pneumothorax or infection.  Central line day #2 and in place.  Repeat CXR in 2 days or if respiratory status changes.      CV: EKG shows sinus bradycardia at rate of 49 bpm prior to surgery may have been suggestive of Cushing's reflex.  HR monitoring of 60s-70s since surgery.  No interventions required at this time.  Continue to monitor and follow SBP parameters.      GI: Thomas Day #2 with regular urinary output.  Slight pink hue to thomas output is likely due to coagulopathy as there is not history of trauma.  No interventions indicated.  Continue to monitor I/Os.      FEN-:    Low Mg of 1.4  Replenish with 4 g Magnesium sulfate.    K on lower end of normal at 3.7.  Replace  once with 25 [place] potassium bicarbonate 1 time.  Repeat CMP in the AM.    HEME:   Platelet count of 41 at admission.  Platelet count decreased to 27 this AM.  Decreased clot strength.  Platelet mapping with TEG.  Complete current transfusion of platelets and re-assess when TEG results come in.  Continue to monitor.  Neurosurgery desires PLT level of 50,000 with this patient's wasting of platelets.     H/H of 8/7/28/8 pre-op.  Morning H/H of 6.5/19.4  Transfusion of RBCs completed this AM.   Repeat CBC with diff in the AM.     ID: No changes indicated at this time.      ENDOCRINE: Continue Accuchecks.  No changes at this time.    MUSCULOSKELETAL: No interventions required at this time.    SKIN: Ecchymosis likely a result of heme status.  Continue to monitor for new ecchymosis and petechia.      PROPHYLAXIS:  DVT SCDs.  No anticoagulation due to patient's presenting coagulopathy   GI Peptic ulcer protocol.   Restraints In place.       LINES/TUBES/CATHETERS: right epidural PERRY drain, thomas.

## 2018-01-30 NOTE — PROGRESS NOTES
Lab called with critical result of platelets at 39. Critical lab result read back to lab.   This critical lab result is within parameters established by  for this patient.

## 2018-01-30 NOTE — CARE PLAN
Problem: Ventilation Defect:  Goal: Ability to achieve and maintain unassisted ventilation or tolerate decreased levels of ventilator support    Intervention: Support and monitor invasive and noninvasive mechanical ventilation  Adult Ventilation Update    Total Vent Days: 2    Patient Lines/Drains/Airways Status    Active Airway     Name: Placement date: Placement time: Site: Days:    Airway Group ET Tube Oral 01/29/18   1254   Oral   2              Sputum/Suction   Cough:  (RN just performed) (01/30/18 1025)  Sputum Amount: Small (01/30/18 0800)  Sputum Color: White;Bloody;Clear (01/30/18 0800)  Sputum Consistency: Thin (01/30/18 0800)    Mobility Group  Activity Performed: Unable to mobilize (01/30/18 1410)  Reason Not Mobilized: Unstable condition (01/30/18 1410)    Events/Summary/Plan: no vent changes made this shift (01/30/18 1536)  CMV 14,440,30%,8

## 2018-01-30 NOTE — PROGRESS NOTES
Called Dr. Trevino regarding available platelets from blood bank, no Rh negative available.  Discussed need for rhogam injection prior to transfusion of Rh positive platelets. MD concepcion for patient to receive rhogam for transfusion of Rh+ platelet product.  Orders received, rhogam and Rh+ platelets given.  See MAR.

## 2018-01-30 NOTE — PROGRESS NOTES
Patient transported to CT at 0400 with 2 RN's, CNA, & RT.  Patient returned to room at 0430. VSS.

## 2018-01-30 NOTE — PROGRESS NOTES
Updated Dr. Trevino on no more platelets in the hospital. Blood blank is calling [facility].

## 2018-01-30 NOTE — PROCEDURES
"Central line Op Note    Date of operation: 1/29/2018    Preoperative diagnosis: acute respiratory failure (518.81)    Postoperative diagnosis: acute respiratory failure (518.81)    Operation performed: Insertion of right internal jugular triple lumen catheter placement.    Surgeon: Dr. Trevino    Anesthesia: local anesthesia    Indications: The patient is a 32 y.o. female. Placement of a central line was performed in the SICU    Procedure: Following implied emergent consent, the patient was properly identified and optimally positioned in bed. Maximal barrier precautions were employed. A\" time out\" was initiated. The correct patient, procedure, and operative site was verified. The patient's allergy status was assessed. Procedural modifications were made as required.    The right neck was prepped with chlorhexidine and draped into a sterile field  The patient was placed in a shallow Trendelenburg position. The internal jugular vein was accessed percutaneously and a wire advanced without resistance. A previously flushed triple lumen catheter was passed using sterile Selldinger technique and secured to the skin with silk sutures. All of the ports flushed and aspirated freely. The site was cleaned. An antibacterial disk was placed about the catheter exit site and dressed with a transparent sterile occlusive dressing.    The patient tolerated the procedure well. There were no apparent immediate complications. A stat portable chest radiograph was ordered.    "

## 2018-01-30 NOTE — PROGRESS NOTES
Lab called with critical result of platelets at 27. Critical lab result read back to lab.   This critical lab result is within parameters established by  for this patient

## 2018-01-30 NOTE — PROGRESS NOTES
Cortrak Placement    Tube Team verified patient name and medical record number prior to tube placement.  Cortrak tube (55 inches, 10 Dominican) placed at 75   cm in right nare.  Per Cortrak picture, tube appears to be in the small bowel.  Nursing Instructions: Awaiting KUB to confirm placement before use for medications or feeding. Once placement confirmed, flush tube with 30 ml of water, and then remove and save stylet, in patient medication drawer.

## 2018-01-30 NOTE — PROGRESS NOTES
Paged Nini at 0510 regarding follow up head CT changes, received call back at 0520 from PA.  RN received OK to update family at bedside regarding CT results.

## 2018-01-30 NOTE — CARE PLAN
Problem: Safety  Goal: Will remain free from injury    Intervention: Educate patient and significant other/support system about adaptive mobility strategies and safe transfers  Bed in lowest position, call light in reach, bed in locked position with bed alarm on.  Patient close to nurses station for any additional needs.         Problem: Pain Management  Goal: Pain level will decrease to patient's comfort goal    Intervention: Follow pain managment plan developed in collaboration with patient and Interdisciplinary Team  Patient pain will continue to be monitored for comfort with rest.  Pain management assessed hourly with treatment as necessary.

## 2018-01-30 NOTE — H&P
TRAUMA HISTORY AND PHYSICAL    CHIEF COMPLAINT: Intracranial hemorrhage.     HISTORY OF PRESENT ILLNESS: The patient is a 32-year-old female with long-standing history of alcohol abuse and medical history significant for cirrhosis who was reported to be at the store with her significant other today when she got in the car and became unresponsive. There is no report of trauma either at that time or the last 24-48 hours. Patient was reportedly driven home and dropped off where 911 was called and she was brought to the emergency room. She was intubated on arrival. She has been entirely unresponsive since arrival. Family is there to provide history. Workup including head CT revealed a very large intracranial hemorrhage with impending herniation so she is to go immediately to the operating room for craniotomy.     The patient was triaged as a T-5000 in accordance with established pre hospital protocols. An expeditious primary and secondary survey with required adjuncts was conducted. See Trauma Narrator for full details.    PAST MEDICAL HISTORY:  has a past medical history of Alcohol abuse (1/29/2018); Anemia; Asthma; Cancer (CMS-HCC) (2011); Ectopic pregnancy; Gestational diabetes (6/18/2013); Gynecological disorder; Hemorrhagic disorder (CMS-HCC); Jaundice (2/16); Liver cirrhosis (CMS-HCC); Pneumonia (07/2017); Snoring; and Thrombocytopenia concurrent with and due to alcoholism (CMS-HCC) (1/29/2018).    PAST SURGICAL HISTORY:  has a past surgical history that includes gyn surgery and tubal coagulation laparoscopic bilateral (10/20/2016).    ALLERGIES: No Known Allergies   CURRENT MEDICATIONS:    Home Medications     Reviewed by Nadira Sánchez R.N. (Registered Nurse) on 01/29/18 at 1602  Med List Status: Not Addressed   Medication Last Dose Status   coagulation factor VIIa recomb (NOVOSEVEN) inj 1,000 mcg  Active   etomidate (AMIDATE) injection  Active   MD ALERT...Pharmacy to implement PROPOFOL CRITICAL CARE  PROTOCOL 1 Each  Active   phytonadione (AQUA-MEPHYTON) 10 mg in NS 50 mL IVPB  Active   propofol (DIPRIVAN) injection  Active   succinylcholine (ANECTINE) injection  Active              FAMILY HISTORY: family history includes Cancer in her paternal grandmother.    SOCIAL HISTORY:  reports that she has been smoking Cigarettes.  She has a 7.50 pack-year smoking history. She has never used smokeless tobacco. She reports that she does not drink alcohol or use drugs.    REVIEW OF SYSTEMS:  Unable to be elicited secondary to the acuity of the patient's condition.    PHYSICAL EXAMINATION:     CONSTITUTIONAL:     Vital Signs: Blood pressure 126/62, pulse 96, temperature (!) 34.5 °C (94.1 °F), resp. rate 14, weight 56.7 kg (125 lb), SpO2 100 %, unknown if currently breastfeeding.   General Appearance: Unresponsive on the ventilator.  HEENT:    Minimal ecchymosis right periorbital area. The pupils are 7 mm minimally reactive. The extraocular muscles are unable to be assessed. The ear canals and tympanic membranes are normal. The nares and oropharynx are clear. The midface and jaw are stable. No malocclusion is evident.  NECK:    Supple without instability. The trachea is midline. There is no jugulovenous distention or cervical crepitance.   RESPIRATORY:   Inspection: Unlabored respirations, no intercostal retractions, paradoxical motion, or accessory muscle use.   Palpation:  The chest is nontender. The clavicles are nondeformed.   Auscultation: clear to auscultation.  CARDIOVASCULAR:   Auscultation: regular rate and rhythm.   Peripheral Pulses: Normal.   ABDOMEN:   Abdomen is soft, nontender, without organomegaly or masses.  GENITOURINARY:   (FEMALE): normal female external genitalia without lesions.  MUSCULOSKELETAL:   The pelvis is stable.  No significant angulation, deformity, or soft tissue injury involving the upper and lower extremities. Normal range of motion.   BACK:   inspection of back is normal.  SKIN:    Several  areas of mild ecchymosis overlying the extremities at needlestick sites, somewhat pale  NEUROLOGIC:    GCS 5 verbal 1,eye 1 motor 3 . Unresponsive.    LABORATORY VALUES:   Recent Labs      01/29/18   1255  01/29/18   1825   WBC  7.6  7.4   RBC  3.46*  2.34*   HEMOGLOBIN  8.7*  6.8*   HEMATOCRIT  28.8*  20.6*   MCV  83.2  89.3   MCH  25.1*  28.2   MCHC  30.2*  31.6*   RDW  69.0*  62.8*   PLATELETCT  41*  53*   MPV   --   11.4     Recent Labs      01/29/18   1255  01/29/18   1825   SODIUM  141  144   POTASSIUM  2.9*  3.9   CHLORIDE  103  111   CO2  24  18*   GLUCOSE  158*  135*   BUN  6*  4*   CREATININE  0.41*  0.38*   CALCIUM  9.1  7.8*     Recent Labs      01/29/18   1255  01/29/18   1825  01/29/18 1910   ASTSGOT  156*  73*   --    ALTSGPT  36  21   --    TBILIRUBIN  3.2*  2.4*   --    ALKPHOSPHAT  110*  55   --    GLOBULIN  3.1  1.9   --    INR  1.67*   --   1.43*     Recent Labs      01/29/18   1255  01/29/18 1910   APTT  36.4*  34.1   INR  1.67*  1.43*        IMAGING:   DX-CHEST-PORTABLE (1 VIEW)   Final Result      New right IJV central line tip projects in satisfactory position. No pneumothorax.      CT-HEAD W/O   Final Result      1.  Massive right subdural hematoma measuring 2.5 cm in thickness      2.  Additionally, 2.8 cm intra-axial right temporal hematoma and small amount of subarachnoid hemorrhage within the right sylvian fissure      3.  Right to left shift measuring 2.1 cm with associated subfalcine herniation. Posterior fossa mass effect with partial effacement of the basilar cisterns and compression of the brainstem      4.  Given lack of history of trauma ruptured aneurysm or AVM is a possibility and CTA head may be indicated if clinically appropriate      Findings were discussed with MICHAEL MORGAN on 1/29/2018 1403 hours      DX-CHEST-PORTABLE (1 VIEW)   Final Result      Endotracheal tube and nasogastric catheter appear appropriately located      CT-CTA HEAD WITH & W/O-POST PROCESS     (Results Pending)   DX-CHEST-PORTABLE (1 VIEW)    (Results Pending)       IMPRESSION AND PLAN:     Active Hospital Problems    Diagnosis   • Subdural hematoma without coma, with loc of unspecified duration, initial encounter (CMS-HCC) [S06.5X9A]     Priority: High     1.  Massive right subdural hematoma measuring 2.5 cm in thickness  2.  Additionally, 2.8 cm intra-axial right temporal hematoma and small amount   of subarachnoid hemorrhage within the right sylvian fissure  3.  Right to left shift measuring 2.1 cm with associated subfalcine herniation.   Posterior fossa mass effect with partial effacement of the basilar cisterns and   compression of the brainstem  Given dose of factor VII as well as platelet transfusion and taken immediately to the operating room for emergent craniotomy.  Central line placed and 3% NaCl bolus started: Continue protocol  Given impending herniation, mannitol given  Continue serial neurologic assessments  CT head ordered for postop  CTA ordered to rule out possible nontraumatic source  Sedation/analgesia  Serial neuro exams  Lino Mcdowell MD. Neurosurgery.     • Thrombocytopenia concurrent with and due to alcoholism (CMS-HCC) [D69.59, F10.20]     Priority: High     Initial platelet count 41  Transfused 2 pheresis units in the operating room  Postop platelet count 53: Transfuse 1 pheresis unit  Continue to trend and transfuse if platelet count less than 100     • Elevated INR [R79.1]     Priority: High     INR 1.67 on admission labs  Due to active bleeding, transfused 1 dose of factor VII  Transfused fresh plasma the operating room  Follow-up coags postop: Correct INR greater than 1.5  Trend     • Acute respiratory failure following trauma and surgery (CMS-HCC) [J95.821]     Priority: High     Intubated on arrival due to mental status  On full ventilator support secondary to head injury at this time  Optimize oxygenation and try to maintain PCO2 around 35     • Alcohol abuse [F10.10]      Priority: High     Long-standing history of alcohol abuse  SUDHIR 0.36 on arrival  Associated pancytopenia, but Normal MCV   On propofol  High likelihood of alcohol withdrawal syndrome if she recovers from head injury     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.30]     Priority: High     Diagnoses documented on a prior admission  Mild elevation of AST  Somewhat abnormal coagulation function  Dustin coags/LFTs         DISPOSITION:  Trauma ICU.    CRITICAL CARE TIME: 55 minutes excluding procedures.  ____________________________________   Michael Trevino D.O.    DD: 1/29/2018  8:05 PM

## 2018-01-30 NOTE — DISCHARGE PLANNING
Care Transition Team Assessment    This SW met at bedside with pt's mom (Aida) to obtain infromation used in this assessment. Pt's mom confirmed the accuracy of pt's facesheet. Pt uses [facility] pharmacy on [address]. Pt lives with her parents (Aida and Dawson) as well as pt's two children ages 4 & 15. Prior to this hospitalization pt was independent in ADLs and driving herself to appointments and errands. Pt is currently unemployed and her parents are supporting her and her children. Pt's mom stated that they have a good support system and pt has lots of family in the area. Pt's mom denied and hx of substance use but per chart review pt has a long hx of alcohol use. Pt's mom had concerns about pt's 15 year old son and how he is coping with his mother being in the hospital. SW provided brochure to the [facility] and pt's mom to reach out to pt's sons insurance and find out what counselors are included in their network.    Plan: Pt's d/c plan is unknown at this time. SW will remain available for support.     Information Source  Orientation : Unable to Assess  Information Given By: Parent  Informant's Name:  (Aida)  Who is responsible for making decisions for patient? : Legal next of kin  Name(s) of Primary Decision Maker:  (Abdelrahman)    Elopement Risk  Legal Hold: No  Ambulatory or Self Mobile in Wheelchair: No-Not an Elopement Risk  Elopement Risk: Not at Risk for Elopement    Discharge Preparedness  What is your plan after discharge?: Uncertain - pending medical team collaboration  What are your discharge supports?: Parent, Sibling  Prior Functional Level: Ambulatory, Drives Self, Independent with Activities of Daily Living, Independent with Medication Management  Difficulity with ADLs: Bathing, Brushing teeth, Dressing, Eating, Toileting, Walking  Difficulity with IADLs: Cooking, Driving, Keeping track of finances, Laundry, Managing medication, Shopping, Using the telephone or  computer    Functional Assesment  Prior Functional Level: Ambulatory, Drives Self, Independent with Activities of Daily Living, Independent with Medication Management    Finances  Financial Barriers to Discharge: No  Prescription Coverage: Yes    Advance Directive  Advance Directive?: Clinically incapacitated / Inappropriate     Psychological Assessment  History of Substance Abuse: None  History of Psychiatric Problems: No  Non-compliant with Treatment: No  Newly Diagnosed Illness: No    Discharge Risks or Barriers  Discharge risks or barriers?: No PCP, Substance abuse  Patient risk factors: Complex medical needs, Multiple ED visits, Substance abuse    Anticipated Discharge Information  Anticipated discharge disposition: Discharge needs currently unknown

## 2018-01-30 NOTE — CARE PLAN
Problem: Ventilation Defect:  Goal: Ability to achieve and maintain unassisted ventilation or tolerate decreased levels of ventilator support    Intervention: Support and monitor invasive and noninvasive mechanical ventilation  Adult Ventilation Update    Total Vent Days: 2    Patient Lines/Drains/Airways Status    Active Airway     Name: Placement date: Placement time: Site: Days:    Airway Group ET Tube Oral 01/29/18   1254   Oral   2              Cough: Non Productive (01/30/18 0308)  Sputum Amount: Scant (01/30/18 0308)  Sputum Color: White;Bloody;Clear (01/30/18 0308)  Sputum Consistency: Thin (01/30/18 0308)    Mobility Group  Activity Performed: Unable to mobilize (01/29/18 2000)  Reason Not Mobilized: Unstable condition (01/29/18 2000)    Events/Summary/Plan: rate decreased to 14 post abg (01/29/18 1912)

## 2018-01-30 NOTE — PROGRESS NOTES
Neurosurgery Progress Note    Subjective:  POD#1 right craniectomy for SDH with herniation.   No neuro changes overnight.   Has been getting platelets without improvement in numbers. Last draw was 27.     Exam:  Right eye swollen shut.  Left pupil 3mm and not reacting.   No withdrawal to stim.     BP  Min: 125/52  Max: 165/68  Pulse  Av  Min: 42  Max: 122  Resp  Avg: 15.7  Min: 11  Max: 26  Temp  Av.3 °C (97.3 °F)  Min: 34.5 °C (94.1 °F)  Max: 37.8 °C (100 °F)  Monitored Temp  Av.9 °C (96.6 °F)  Min: 33.7 °C (92.7 °F)  Max: 37.8 °C (100 °F)  SpO2  Av.8 %  Min: 10 %  Max: 100 %    No Data Recorded    Recent Labs      18   0538   WBC  7.4  9.4  6.9   RBC  2.34*  2.32*  2.27*   HEMOGLOBIN  6.8*  6.8*  6.5*   HEMATOCRIT  20.6*  20.2*  19.4*   MCV  89.3  87.1  85.5   MCH  28.2  29.3  28.6   MCHC  31.6*  33.7  33.5*   RDW  62.8*  56.5*  53.5*   PLATELETCT  53*  39*  27*   MPV  11.4  12.4   --      Recent Labs      18   0538   SODIUM  144  146*  146*   POTASSIUM  3.9  3.9  3.7   CHLORIDE  111  113*  114*   CO2  18*  18*  18*   GLUCOSE  135*  124*  110*   BUN  4*  4*  5*     Recent Labs      18   0538   APTT  36.4*  34.1  33.3   INR  1.67*  1.43*  1.61*     Recent Labs      18   0538   REACTMIN  5.4  6.4  6.2   CLOTKINET  1.6  2.4  3.2*   CLOTANGL  68.6  63.5  58.5   MAXCLOTS  54.4  49.6*  42.6*   LEX40AWS  0.0  0.0  0.0   PRCINADP  45.1  74.7  65.3   PRCINAA  90.5  93.4  97.4       Intake/Output       18 - 1859 18 - 18 0659       Total  Total       Intake    I.V.    3977.5 5977.5  --  -- --    Crystalloid Intake 2000 -- -- --    Magnesium Sulfate Volume -- 43.3 43.3 -- -- --    Propofol Volume -- 34.2 34.2 -- -- --    IV Volume (LR ) -- 900 900 -- -- --    IV  Volume (NS bolus ) -- 3000 3000 -- -- --    Blood  1550  2146 3696  1848.5  -- 1848.5    PRBC Total Volume (Non-Barcoded) 700 -- 700 -- -- --    FFP Total Volume (Non-Barcoded) 450 -- 450 -- -- --    Platelets Total Volume (Non-Barcoded) 400 -- 400 -- -- --    Volume (RELEASE RED BLOOD CELLS) -- 700 700 -- -- --    Volume (RELEASE PLATELET PHERESIS) -- 450 450 -- -- --    Volume (RELEASE RED BLOOD CELLS) -- 500 500 -- -- --    Volume (RELEASE PLATELET PHERESIS) -- 281 281 -- -- --    Volume (RELEASE PLATELET PHERESIS) -- 215 215 -- -- --    Volume (RELEASE FRESH FROZEN PLASMA) -- -- -- 499.5 -- 499.5    Volume (RELEASE PLATELET PHERESIS) -- -- -- 499.5 -- 499.5    Volume (RELEASE FRESH FROZEN PLASMA) -- -- -- 499.5 -- 499.5    Volume (RELEASE RED BLOOD CELLS) -- -- -- 350 -- 350    Enteral  --  60 60  --  -- --    Free Water / Tube Flush -- 60 60 -- -- --    Total Intake 3550 6183.5 9733.5 1848.5 -- 1848.5       Output    Urine  700  1865 2565  --  -- --    Indwelling Cathether 700 1865 2565 -- -- --    Drains  --  120 120  --  -- --    Oral Gastric Tube -- 20 20 -- -- --    Bony Rachel 1 -- 100 100 -- -- --    Stool  --  -- --  --  -- --    Number of Times Stooled -- 0 x 0 x -- -- --    Blood  1000  -- 1000  --  -- --    Est. Blood Loss (mL) 1000 -- 1000 -- -- --    Total Output 1700 1985 3685 -- -- --       Net I/O     1850 4198.5 6048.5 1848.5 -- 1848.5            Intake/Output Summary (Last 24 hours) at 01/30/18 0833  Last data filed at 01/30/18 0804   Gross per 24 hour   Intake         15941.03 ml   Output             3685 ml   Net          7897.03 ml            • Rho D Immune Globulin  1,500 Units Once   • etomidate   ED ONCE PRN   • succinylcholine   ED ONCE PRN   • propofol  0-80 mcg/kg/min Continuous   • vitamin k (MEPHYTON) ivpb  10 mg Once   • coagulation factor VIIa recomb  1,000 mcg Once   • Pharmacy Consult Request  1 Each PRN   • Respiratory Care per Protocol   Continuous RT   • chlorhexidine  15 mL  Q12HRS   • docusate sodium  100 mg BID   • senna-docusate  1 Tab Nightly   • senna-docusate  1 Tab Q24HRS PRN   • polyethylene glycol/lytes  1 Packet BID   • magnesium hydroxide  30 mL DAILY   • bisacodyl  10 mg Q24HRS PRN   • fleet  1 Each Once PRN   • fentaNYL   mcg Q HOUR PRN   • insulin regular  2-9 Units Q6HRS    And   • glucose 4 g  16 g Q15 MIN PRN    And   • dextrose 50%  25 mL Q15 MIN PRN   • LR   Continuous   • famotidine  20 mg BID    Or   • famotidine  20 mg BID   • ondansetron  4 mg Q4HRS PRN   • Levetiracetam (KEPPRA) IV  500 mg BID   • 3% sodium chloride  500 mL Continuous   • hydrALAZINE  20 mg Q6HRS PRN   • enalaprilat  1.25 mg Q6HRS PRN       Assessment and Plan:  POD #1 right craniectomy for SDH with herniation.  Platelets low despite transfusions. Discussed with Dr. Reyes. He would normally prefer platelet level of 75,000. Given this patients wasting of platelets he would accept a level of 50,000. He does understand that the hospital is low/out of platelets and blood services has been called. May need to consider stopping transfusions if continued transfusions not helping.   Prognosis is poor.   Prophylactic anticoagulation: no

## 2018-01-30 NOTE — PROGRESS NOTES
Pt arrived to S124 from OR. Report received. Assessment complete, pt currently flaccid, -cough, -gag, -corneal. Right pupil 7mm and nonreactive, left pupil 3mm and nonreactive.  -120, -130/60-70.

## 2018-01-30 NOTE — PROGRESS NOTES
Trauma/Surgical Progress Note    Author: Fred Reddy Date & Time created: 1/30/2018   12:49 PM     Interval Events:  Critical head injury   Poor prognostics   Mortality risk significant   High MELD score with hypersplenism   Uncorrectable coagulopathy   Transfusing platelets and blood   Guarded condition   On ventilator  Review of Systems   Unable to perform ROS: medical condition     Hemodynamics:  Blood pressure 100/45, pulse 68, temperature 36.5 °C (97.7 °F), resp. rate 14, weight 60 kg (132 lb 4.4 oz), SpO2 100 %, unknown if currently breastfeeding.     Respiratory:  Manriquez Vent Mode: APVCMV, Rate (breaths/min): 14, PEEP/CPAP: 8, FiO2: 30, P Peak (PIP): 25, P MEAN: 11 Respiration: 14, Pulse Oximetry: 100 %     Work Of Breathing / Effort: Vented  RUL Breath Sounds: Crackles, RML Breath Sounds: Crackles, RLL Breath Sounds: Diminished, CHON Breath Sounds: Crackles, LLL Breath Sounds: Diminished  Fluids:    Intake/Output Summary (Last 24 hours) at 01/30/18 1249  Last data filed at 01/30/18 1226   Gross per 24 hour   Intake         40328.68 ml   Output             4235 ml   Net          7928.68 ml     Admit Weight: 56.7 kg (125 lb)  Current Weight: 60 kg (132 lb 4.4 oz)    Physical Exam   Constitutional: She appears well-developed.   HENT:   Crani  Sig bruising STS right face   Eyes: Pupils are equal, round, and reactive to light.   Right eye swollen shut   Neck: No JVD present. No tracheal deviation present. No thyromegaly present.   Cardiovascular: Regular rhythm, normal heart sounds and intact distal pulses.    tachy   Pulmonary/Chest: Effort normal and breath sounds normal.   Intubated and vented    Abdominal: Soft. Bowel sounds are normal. She exhibits distension. There is no tenderness.   No fluid wave    Genitourinary:   Genitourinary Comments: thomas   Musculoskeletal: She exhibits no edema, tenderness or deformity.   Bruise right knee   Lymphadenopathy:     She has no cervical adenopathy.   Neurological:    Decorticate    Skin: Skin is warm and dry.   Nursing note and vitals reviewed.      Medical Decision Making/Problem List:    Active Hospital Problems    Diagnosis   • Subdural hematoma without coma, with loc of unspecified duration, initial encounter (CMS-HCC) [S06.5X9A]     Priority: High     1.  Massive right subdural hematoma measuring 2.5 cm in thickness  2.  Additionally, 2.8 cm intra-axial right temporal hematoma and small amount   of subarachnoid hemorrhage within the right sylvian fissure  3.  Right to left shift measuring 2.1 cm with associated subfalcine herniation.   Posterior fossa mass effect with partial effacement of the basilar cisterns and   compression of the brainstem  Given dose of factor VII as well as platelet transfusion and taken immediately to the operating room for emergent craniotomy.  Central line placed and 3% NaCl bolus started: Continue protocol  Given impending herniation, mannitol given  Continue serial neurologic assessments  CT head ordered for postop  CTA ordered to rule out possible nontraumatic source  Sedation/analgesia  Serial neuro exams  Lino Mcdowell MD. Neurosurgery.     • Thrombocytopenia concurrent with and due to alcoholism (CMS-HCC) [D69.59, F10.20]     Priority: High     Initial platelet count 41  Transfused 2 pheresis units in the operating room  Postop platelet count 53: Transfuse 1 pheresis unit  Continue to trend and transfuse if platelet count less than 100     • Elevated INR [R79.1]     Priority: High     INR 1.67 on admission labs  Due to active bleeding, transfused 1 dose of factor VII  Transfused fresh plasma the operating room  Follow-up TEG  Trend     • Acute respiratory failure following trauma and surgery (CMS-HCC) [J95.821]     Priority: High     Intubated on arrival due to mental status  On full ventilator support secondary to head injury at this time  Optimize oxygenation and try to maintain PCO2 around 35     • Alcohol abuse [F10.10]     Priority:  High     Long-standing history of alcohol abuse  SUDHIR 0.32 on arrival  Associated pancytopenia, but Normal MCV   On propofol  High likelihood of alcohol withdrawal syndrome if she recovers from head injury     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.30]     Priority: High     Diagnoses documented on a prior admission  Mild elevation of AST  Somewhat abnormal coagulation function  Dustin coags/LFTs       Core Measures & Quality Metrics:  Labs reviewed, Medications reviewed and Radiology images reviewed  Lee catheter: Strict Intake and Output During Sepisis or Shock  Central line in place: Need for access    DVT Prophylaxis: Contraindicated - High bleeding risk  DVT prophylaxis - mechanical: SCDs  Ulcer prophylaxis: Yes        ANALIA Score  Discussed patient condition with Family, RN, RT and Pharmacy.  CRITICAL CARE TIME EXCLUDING PROCEDURES: 55    Minutes  I independently reviewed pertinent clinical lab tests from the last 48 hours and ordered additional follow up clinical lab tests.  I independently reviewed pertinent radiographic images and the radiologist's reports from the last 48 hours and ordered additional follow up radiographic studies.  I reviewed the details of the available patient records and documentation by consulting physicians in EPIC up to today, summated the information, and utilized the information as warranted in today's medical decision making for this patient.  I personally evaluated the patient condition at bedside and discussed the daily plan(s) with available nursing staff, dieticians, social workers, pharmacists on rounds.  I am actively managing this patient's mechanical ventilation and directly involved in the adjustment of multiple settings (FiO2, PEEP, tidal volume, and minute ventilation) based on my personal bedside evaluation of this patient's radiographic, and laboratory findings and clinical changes throughout the day.  I am directing transfusions and attempts to correct  coagulopathy

## 2018-01-31 NOTE — DIETARY
"       Nutrition services: Nutrition Support Assessment    Day 2 of admit.  31 yo female with admitting diagnosis: unresponsive    Current problem list:  1) SDH  2) acute respiratory failure - on vent  3) ETOH abuse  4) cirrhosis    Assessment:  Estimated Nutritional Needs: based on: height 5'6\", weight 60 kg, IBW 58.9 kg, BMI 21.35    Calculation/Equation MSJ x 1.2 = 1587 kcals  Calories/day: 1600 - 1750 kcals (26 - 29 kcals/kg)  Protein/day: 90 - 120 g protein (1.5 - 2.0 g/kg)     Evaluation:   1) pt on vent in need of nutrition support. Orders received for TF to begin.  2) cortrak placed for enteral access - confirmed in the stomach  3) propofol @ 10.2 ml/hr providing 269 kcals/day  4) trauma pt will benefit from Impact 1.5 to meet increased nutritional needs with CHI       Recommendations/Plan:  1) start and advance TF per protocol  2) Impact 1.5 full goal 45 ml/hr will provide 1620 kcals, 102 g protein, 834 ml H20/day  3) no need to adjust TF rate on current propofol will monitor propofol infusion daily.     "

## 2018-01-31 NOTE — CARE PLAN
Problem: Risk for injury related to physical restraint use  Goal: Safe and appropriate use of physical restraints. Restraints discontinued at the earliest possibility while ensuring patient safety.  Outcome: PROGRESSING AS EXPECTED  Restraints assessed Q2 hours. CMS intact, ROM performed. Restraint use still indicated at this time.    Problem: Skin Integrity  Goal: Skin Integrity is maintained or improved  Outcome: PROGRESSING AS EXPECTED  Pt repositioned Q2 hours. Skin assessed every shift. Pillows used for repositioning.

## 2018-01-31 NOTE — CARE PLAN
Problem: Respiratory:  Goal: Respiratory status will improve    Intervention: Assess and monitor pulmonary status  Work with RT to maintain pts o2 sats above 90%, HOB elevated, suctioning as needed.       Problem: Skin Integrity  Goal: Risk for impaired skin integrity will decrease    Intervention: Assess risk factors for impaired skin integrity and/or pressure ulcers  Assess skin during shift, turning every two hours, using pillows for support.

## 2018-01-31 NOTE — PROGRESS NOTES
Called Dr. Reddy regarding INR level for 0600 labs.  Informed MD regarding level at 1.52.  No orders to transfuse FFP.  MD wants to wait for TEG results to return before transfusion of blood product.

## 2018-01-31 NOTE — CARE PLAN
Problem: Ventilation Defect:  Goal: Ability to achieve and maintain unassisted ventilation or tolerate decreased levels of ventilator support    Intervention: Support and monitor invasive and noninvasive mechanical ventilation  Adult Ventilation Update    Total Vent Days: 3    Patient Lines/Drains/Airways Status    Active Airway     Name: Placement date: Placement time: Site: Days:    Airway Group ET Tube Oral 01/29/18   1254   Oral   3                Cough: Non Productive (01/31/18 0303)  Sputum Amount: Scant (01/31/18 0303)  Sputum Color: Bloody;Clear (01/31/18 0303)  Sputum Consistency: Thin (01/31/18 0303)    Mobility Group  Activity Performed: Unable to mobilize (01/30/18 2000)  Reason Not Mobilized: Unstable condition (01/30/18 2000)    Events/Summary/Plan: no vent changes made

## 2018-01-31 NOTE — CARE PLAN
Problem: Skin Integrity  Goal: Risk for impaired skin integrity will decrease    Intervention: Assess risk factors for impaired skin integrity and/or pressure ulcers  Q2 hour repositioning of pillows for support and prophylaxis of potential deep tissue injuries.         Problem: Safety  Goal: Free from accidental injury    Intervention: Initiate Safety Measures  Bed in lowest position, bed in locked position with alarm activated.  Room close to nurses station for continued monitoring of patient safety.

## 2018-01-31 NOTE — PROGRESS NOTES
"  Trauma/Surgical Progress Note    Author: Fred Reddy Date & Time created: 1/31/2018   3:25 PM     Interval Events:  Patient remained comatose  Pupils are nonreactive with significant dilatation on the right  No clinical evidence of bleeding  Withdrawing now to painful stimuli  Remains on high level vent support  Thromboelastogram appears to be corrected  feedings initiated  Poor prognosis guarded condition  Remains critical  Electrolyte imbalance addressed    Review of Systems   Unable to perform ROS: medical condition     Hemodynamics:  Blood pressure 153/62, pulse 86, temperature 37.5 °C (99.5 °F), resp. rate 19, height 1.676 m (5' 6\"), weight 66 kg (145 lb 8.1 oz), SpO2 92 %, unknown if currently breastfeeding.     Respiratory:  Manriquez Vent Mode: APVCMV, Rate (breaths/min): 14, PEEP/CPAP: 8, FiO2: 30, P Peak (PIP): 27, P MEAN: 14 Respiration: 19, Pulse Oximetry: 92 %     Work Of Breathing / Effort: Vented  RUL Breath Sounds: Clear, RML Breath Sounds: Clear, RLL Breath Sounds: Diminished, CHON Breath Sounds: Clear, LLL Breath Sounds: Diminished  Fluids:    Intake/Output Summary (Last 24 hours) at 01/31/18 1525  Last data filed at 01/31/18 1400   Gross per 24 hour   Intake          4450.48 ml   Output              935 ml   Net          3515.48 ml     Admit Weight: 56.7 kg (125 lb)  Current Weight: 66 kg (145 lb 8.1 oz)    Physical Exam   Constitutional: She appears well-developed.   HENT:   Craniectomy right   Sig bruising STS right face   Eyes:   Bilateral periorbital edema  NR right 7 Left3   Neck: No JVD present. No tracheal deviation present. No thyromegaly present.   Cardiovascular: Regular rhythm, normal heart sounds and intact distal pulses.    tachy   Pulmonary/Chest: Effort normal and breath sounds normal.   Intubated and vented    Abdominal: Soft. Bowel sounds are normal. She exhibits distension. There is no tenderness.   No fluid wave    Genitourinary:   Genitourinary Comments: thomas "   Musculoskeletal: She exhibits no edema, tenderness or deformity.   Bruise right knee   Lymphadenopathy:     She has no cervical adenopathy.   Neurological:   Decorticate uppers   Withdraws lower    Skin: Skin is warm and dry.   Nursing note and vitals reviewed.      Medical Decision Making/Problem List:    Active Hospital Problems    Diagnosis   • Subdural hematoma without coma, with loc of unspecified duration, initial encounter (CMS-HCC) [S06.5X9A]     Priority: High     1.  Massive right subdural hematoma measuring 2.5 cm in thickness  2.  Additionally, 2.8 cm intra-axial right temporal hematoma and small amount   of subarachnoid hemorrhage within the right sylvian fissure  3.  Right to left shift measuring 2.1 cm with associated subfalcine herniation.   Posterior fossa mass effect with partial effacement of the basilar cisterns and   compression of the brainstem  Given dose of factor VII as well as platelet transfusion and taken immediately to the operating room for emergent craniotomy.  Central line placed and 3% NaCl bolus started: Continue protocol  Given impending herniation, mannitol given  Continue serial neurologic assessments  CT head ordered for postop  CTA neg for aneurysm  Sedation/analgesia  Serial neuro exams  Lino Mcdowell MD. Neurosurgery.     • Thrombocytopenia concurrent with and due to alcoholism (CMS-HCC) [D69.59, F10.20]     Priority: High     Initial platelet count 41  Transfused 2 pheresis units in the operating room  Postop platelet count 53: Transfuse 1 pheresis unit  Continue to trend and transfuse if platelet count less than 100  1/31 transfused for low MA on TEG      • Elevated INR [R79.1]     Priority: High     INR 1.67 on admission labs  Due to active bleeding, transfused 1 dose of factor VII  Transfused fresh plasma the operating room  Follow-up TEGs  Trend     • Acute respiratory failure following trauma and surgery (CMS-HCC) [J95.821]     Priority: High     Intubated on arrival  due to mental status  On full ventilator support secondary to head injury at this time  Optimize oxygenation and try to maintain PCO2 around 35     • Alcohol abuse [F10.10]     Priority: High     Long-standing history of alcohol abuse  SUDHIR 0.32 on arrival  Associated pancytopenia, but Normal MCV   On propofol  High likelihood of alcohol withdrawal syndrome if she recovers from head injury     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.30]     Priority: High     Diagnoses documented on a prior admission  Mild elevation of AST  Somewhat abnormal coagulation function  Dustin coags/LFTs       Core Measures & Quality Metrics:  Labs reviewed, Medications reviewed and Radiology images reviewed  Lee catheter: Strict Intake and Output During Sepisis or Shock  Central line in place: Need for access    DVT Prophylaxis: Contraindicated - High bleeding risk  DVT prophylaxis - mechanical: SCDs  Ulcer prophylaxis: Yes        ANALIA Score  Discussed patient condition with Family, RN, RT, Pharmacy and neurosurgery.  CRITICAL CARE TIME EXCLUDING PROCEDURES: 48    minutes  I independently reviewed pertinent clinical lab tests from the last 48 hours and ordered additional follow up clinical lab tests.  I independently reviewed pertinent radiographic images and the radiologist's reports from the last 48 hours and ordered additional follow up radiographic studies.  I reviewed the details of the available patient records and documentation by consulting physicians in EP[facility] up to today, summated the information, and utilized the information as warranted in today's medical decision making for this patient.  I personally evaluated the patient condition at bedside and discussed the daily plan(s) with available nursing staff, dieticians, social workers, pharmacists on rounds.  I am actively managing this patient's mechanical ventilation and directly involved in the adjustment of multiple settings (FiO2, PEEP, tidal volume, and minute ventilation)  based on my personal bedside evaluation of this patient's radiographic, and laboratory findings and clinical changes throughout the day.  Address electrolyte imbalance and nutritional needs

## 2018-01-31 NOTE — PROGRESS NOTES
Neurosurgery Progress Note    Subjective:  POD#2 right craniectomy for SDH with herniation.   No significant changes overnight  Remains on 3% Na  Platelets 59K today, Na 151, INR 1.5, teg study pending  Drain 0    Exam:  Right eye swollen shut.  Right pupil 6mm and nonreactive, left pupil is 3 mm and nonreactive  She extends her left upper extremity to painful stimuli, more decorticate on right  Crani site intact, drain intact    BP  Min: 95/57  Max: 109/47  Pulse  Av  Min: 57  Max: 82  Resp  Avg: 15.1  Min: 13  Max: 41  Temp  Av.4 °C (97.6 °F)  Min: 36.3 °C (97.3 °F)  Max: 36.6 °C (97.9 °F)  Monitored Temp  Av.5 °C (97.7 °F)  Min: 36.1 °C (97 °F)  Max: 37 °C (98.6 °F)  SpO2  Av %  Min: 99 %  Max: 100 %    No Data Recorded    Recent Labs      18   18118   2330  18   0530   WBC  7.2  7.5  7.7   RBC  2.62*  2.60*  2.66*   HEMOGLOBIN  7.5*  7.7*  7.9*   HEMATOCRIT  22.6*  22.2*  22.9*   MCV  86.3  85.4  86.1   MCH  28.6  29.6  29.7   MCHC  33.2*  34.7  34.5   RDW  51.0*  52.0*  55.6*   PLATELETCT  65*  62*  59*   MPV  11.8  13.2*  11.7     Recent Labs      18   18118   2330  18   0530   SODIUM  147*  149*  151*   POTASSIUM  3.4*  3.5*  3.5*   CHLORIDE  118*  120*  121*   CO2  22  22  22   GLUCOSE  108*  117*  124*   BUN  8  10  9     Recent Labs      18   1910  18   0538  18   0530   APTT  34.1  33.3  31.2   INR  1.43*  1.61*  1.52*     Recent Labs      18   0950  18   1305  18   0530   REACTMIN  6.8  7.1  5.4   CLOTKINET  2.2  1.8  1.5   CLOTANGL  63.4  67.5  68.8   MAXCLOTS  54.5  60.2  54.3   CTI30SHI  0.0  0.0  0.3   PRCINADP  49.1  30.5  16.4   PRCINAA  95.5  90.1  53.4       Intake/Output       18 - 18 0659 18 - 1859       Total  Total       Intake    I.V.  1477.8  1819.9 3297.7  --  -- --    Propofol Volume 177.8 119.9 297.7 -- -- --    IV  Volume (LR )  -- -- --    IV Volume (3%)  -- -- --    IV Volume (LR Bolus) -- 500 500 -- -- --    Blood  2821.5  -- 2821.5  --  -- --    Volume (RELEASE FRESH FROZEN PLASMA) 499.5 -- 499.5 -- -- --    Volume (RELEASE PLATELET PHERESIS) 499.5 -- 499.5 -- -- --    Volume (RELEASE FRESH FROZEN PLASMA) 499.5 -- 499.5 -- -- --    Volume (RELEASE RED BLOOD CELLS) 350 -- 350 -- -- --    Volume (RELEASE PLATELET PHERESIS) 226 -- 226 -- -- --    Volume (RELEASE PLATELET PHERESIS) 226 -- 226 -- -- --    Volume (RELEASE PLATELET PHERESIS) 171 -- 171 -- -- --    Volume (RELEASE RED BLOOD CELLS) 350 -- 350 -- -- --    Other  --  60 60  --  -- --    Medications (P.O./ Enteral Liquids) -- 60 60 -- -- --    Enteral  25  390 415  --  -- --    Enteral Volume 25 300 325 -- -- --    Free Water / Tube Flush -- 90 90 -- -- --    Total Intake 4324.3 2269.9 6594.2 -- -- --       Output    Urine  1025  430 1455  --  -- --    Indwelling Cathether 6322 316 7484 -- -- --    Drains  --  0 0  --  -- --    Bony Rachel 1 -- 0 0 -- -- --    Stool  --  -- --  --  -- --    Number of Times Stooled -- 0 x 0 x -- -- --    Total Output 5902 259 6172 -- -- --       Net I/O     3299.3 1839.9 5139.2 -- -- --            Intake/Output Summary (Last 24 hours) at 01/31/18 0817  Last data filed at 01/31/18 0600   Gross per 24 hour   Intake          4418.53 ml   Output             1255 ml   Net          3163.53 ml            • omeprazole 2 mg/mL in sodium bicarbonate  40 mg DAILY   • 3% sodium chloride  500 mL Continuous   • docusate sodium  100 mg BID    Or   • docusate sodium 100mg/10mL  100 mg BID   • etomidate   ED ONCE PRN   • succinylcholine   ED ONCE PRN   • propofol  0-80 mcg/kg/min Continuous   • Pharmacy Consult Request  1 Each PRN   • Respiratory Care per Protocol   Continuous RT   • chlorhexidine  15 mL Q12HRS   • senna-docusate  1 Tab Nightly   • senna-docusate  1 Tab Q24HRS PRN   • polyethylene glycol/lytes  1 Packet BID    • magnesium hydroxide  30 mL DAILY   • bisacodyl  10 mg Q24HRS PRN   • fleet  1 Each Once PRN   • fentaNYL   mcg Q HOUR PRN   • LR   Continuous   • ondansetron  4 mg Q4HRS PRN   • Levetiracetam (KEPPRA) IV  500 mg BID   • hydrALAZINE  20 mg Q6HRS PRN   • enalaprilat  1.25 mg Q6HRS PRN       Assessment and Plan:  POD #2 right craniectomy for SDH with herniation.  Platelet count improved.  Continue to attempt to keep platelet count >50K  Will leave drain for now and repeat Head CT in AM given high risk of re-bleed  Prognosis is poor.   Prophylactic anticoagulation: no

## 2018-02-01 NOTE — PROGRESS NOTES
Paged Dr. Douglass's PA regarding patient requiring very deep stimulation with decreased withdraw in all 4 extremities.  Updated PA on increased HR ('s) and increased temperature.  No new orders received, repeat CT scan scheduled for 2/1/18 at 0500.

## 2018-02-01 NOTE — CARE PLAN
Problem: Ventilation Defect:  Goal: Ability to achieve and maintain unassisted ventilation or tolerate decreased levels of ventilator support    Intervention: Support and monitor invasive and noninvasive mechanical ventilation  Adult Ventilation Update    Total Vent Days: 4    Patient Lines/Drains/Airways Status    Active Airway     Name: Placement date: Placement time: Site: Days:    Airway Group ET Tube Oral 7.0 01/29/18   1254   Oral   4              Sputum/Suction   Cough: Non Productive (02/01/18 1200)  Sputum Amount: Small (02/01/18 1200)  Sputum Color: Bloody;Clear (02/01/18 1200)  Sputum Consistency: Thin (02/01/18 1200)    Mobility Group  Activity Performed: Unable to mobilize (02/01/18 0800)  Pt Calls for Assistance: No (02/01/18 0800)  Reason Not Mobilized: Bed rest (02/01/18 0800)    Events/Summary/Plan: no vent changes made, no sbt per Dr. Gillis (02/01/18 1432)  CMV 14,440,55%,8

## 2018-02-01 NOTE — PROGRESS NOTES
Neurosurgery Progress Note    Subjective:  POD#3 right craniectomy for SDH with herniation.   No significant changes overnight  Remains on 3% Na  Platelets 51K today  Drain 0 still  CT yesterday stable.    Exam:  Right eye swollen shut.  Right pupil 6mm and nonreactive, left pupil is 3 mm and nonreactive  She extends her left upper extremity to painful stimuli, more decorticate on right  Crani site intact, drain intact    BP  Min: 153/62  Max: 153/62  Pulse  Av.8  Min: 60  Max: 110  Resp  Av.7  Min: 14  Max: 22  Temp  Av.5 °C (99.5 °F)  Min: 37.5 °C (99.5 °F)  Max: 37.5 °C (99.5 °F)  Monitored Temp  Av.8 °C (100 °F)  Min: 37.4 °C (99.3 °F)  Max: 38.5 °C (101.3 °F)  SpO2  Av %  Min: 91 %  Max: 100 %    No Data Recorded    Recent Labs      180  18   2340  18   0515   WBC  4.0*  7.9  9.2   RBC  2.68*  2.60*  2.55*   HEMOGLOBIN  7.9*  7.5*  7.5*   HEMATOCRIT  23.6*  22.9*  22.6*   MCV  88.1  88.1  88.6   MCH  29.5  28.8  29.4   MCHC  33.5*  32.8*  33.2*   RDW  61.5*  61.0*  61.9*   PLATELETCT  57*  50*  54*   MPV  12.4  11.5  12.4     Recent Labs      18   1740  18   2340  18   0515   SODIUM  152*  153*  154*   POTASSIUM  3.4*  3.3*  3.3*   CHLORIDE  124*  125*  123*   CO2  23  23  23   GLUCOSE  130*  166*  128*   BUN  9  11  13     Recent Labs      18   1910  18   0538  18   0530  18   0515   APTT  34.1  33.3  31.2   --    INR  1.43*  1.61*  1.52*  1.66*     Recent Labs      18   1305  18   0530  18   0515   REACTMIN  7.1  5.4  6.4   CLOTKINET  1.8  1.5  1.8   CLOTANGL  67.5  68.8  66.2   MAXCLOTS  60.2  54.3  54.4   UKH51RST  0.0  0.3  0.1   PRCINADP  30.5  16.4  57.2   PRCINAA  90.1  53.4  76.4       Intake/Output       18 - 18 0659 18 - 18 0659       Total  Total       Intake    I.V.  1128.2  873.5 2001.6  107.9  -- 107.9    Propofol  Volume 88.2 83.5 171.6 7.9 -- 7.9    IV Volume (LR )  100 -- 100    IV Volume (3%) 440 190 630 0 -- 0    Blood  207  -- 207  --  -- --    Volume (RELEASE PLATELET PHERESIS) 207 -- 207 -- -- --    Other  60  -- 60  --  -- --    Medications (P.O./ Enteral Liquids) 60 -- 60 -- -- --    Enteral  490  540 1030  90  -- 90    Enteral Volume  90 -- 90    Free Water / Tube Flush 30 -- 30 -- -- --    Total Intake 1885.2 1413.5 3298.6 197.9 -- 197.9       Output    Urine  555  445 1000  45  -- 45    Indwelling Cathether  45 -- 45    Drains  15  0 15  --  -- --    Bony Rachel 1 15 0 15 -- -- --    Stool  --  -- --  --  -- --    Number of Times Stooled 0 x -- 0 x -- -- --    Total Output  45 -- 45       Net I/O     1315.2 968.5 2283.6 152.9 -- 152.9            Intake/Output Summary (Last 24 hours) at 02/01/18 0841  Last data filed at 02/01/18 0800   Gross per 24 hour   Intake          3142.92 ml   Output              985 ml   Net          2157.92 ml            • omeprazole 2 mg/mL in sodium bicarbonate  40 mg DAILY   • 3% sodium chloride  500 mL Continuous   • docusate sodium  100 mg BID    Or   • docusate sodium 100mg/10mL  100 mg BID   • etomidate   ED ONCE PRN   • succinylcholine   ED ONCE PRN   • propofol  0-80 mcg/kg/min Continuous   • Pharmacy Consult Request  1 Each PRN   • Respiratory Care per Protocol   Continuous RT   • chlorhexidine  15 mL Q12HRS   • senna-docusate  1 Tab Nightly   • senna-docusate  1 Tab Q24HRS PRN   • polyethylene glycol/lytes  1 Packet BID   • magnesium hydroxide  30 mL DAILY   • bisacodyl  10 mg Q24HRS PRN   • fleet  1 Each Once PRN   • fentaNYL   mcg Q HOUR PRN   • LR   Continuous   • ondansetron  4 mg Q4HRS PRN   • levETIRAcetam (KEPPRA) IV  500 mg BID   • hydrALAZINE  20 mg Q6HRS PRN   • enalaprilat  1.25 mg Q6HRS PRN       Assessment and Plan:  POD #3 right craniectomy for SDH with herniation.  Platelet count stable.  Continue to attempt  to keep platelet count >50K  Ok to d/c drain today, place compression dressing to drain site.    Prognosis is poor.   Prophylactic anticoagulation: no

## 2018-02-01 NOTE — CARE PLAN
Problem: Ventilation Defect:  Goal: Ability to achieve and maintain unassisted ventilation or tolerate decreased levels of ventilator support    Intervention: Support and monitor invasive and noninvasive mechanical ventilation  Adult Ventilation Update    Total Vent Days: 3    Patient Lines/Drains/Airways Status    Active Airway     Name: Placement date: Placement time: Site: Days:    Airway Group ET Tube Oral 01/29/18   1254   Oral   3              Sputum/Suction   Cough: Non Productive (01/31/18 1600)  Sputum Amount: Small (01/31/18 1600)  Sputum Color: Bloody;Clear (01/31/18 1600)  Sputum Consistency: Thin (01/31/18 1600)    Mobility Group  Activity Performed: Unable to mobilize (01/31/18 0800)  Reason Not Mobilized: Unstable condition;Bed rest (01/31/18 0800)    Events/Summary/Plan: increased oxygen to 45%, no other vent changes made (01/31/18 1705)  CMV 14,440,45%, 8

## 2018-02-01 NOTE — PROGRESS NOTES
"  Trauma/Surgical Progress Note    Author: Darío Gillis Date & Time created: 2/1/2018   9:23 AM     Interval Events:  Not a vent wean candidate   Coagulopathy  TEG  Tf goal  lovenox on hold  Duplex screen in am  Fluid bolus and plt transfusion  Review of Systems   Unable to perform ROS: patient unresponsive     Hemodynamics:  Blood pressure 153/62, pulse 85, temperature 37.5 °C (99.5 °F), resp. rate 19, height 1.676 m (5' 6\"), weight 67.1 kg (147 lb 14.9 oz), SpO2 93 %, unknown if currently breastfeeding.     Respiratory:  Manriquez Vent Mode: APVCMV, Rate (breaths/min): 14, PEEP/CPAP: 8, FiO2: 45, P Peak (PIP): 25, P MEAN: 9.3 Respiration: 19, Pulse Oximetry: 93 %     Work Of Breathing / Effort: Vented  RUL Breath Sounds: Clear, RML Breath Sounds: Clear, RLL Breath Sounds: Diminished, CHON Breath Sounds: Clear, LLL Breath Sounds: Diminished  Fluids:    Intake/Output Summary (Last 24 hours) at 02/01/18 0923  Last data filed at 02/01/18 0800   Gross per 24 hour   Intake          3142.92 ml   Output              985 ml   Net          2157.92 ml     Admit Weight: 56.7 kg (125 lb)  Current Weight: 67.1 kg (147 lb 14.9 oz)    Physical Exam   Eyes: Pupils are equal, round, and reactive to light.   Cardiovascular: Normal rate.    Pulmonary/Chest: She has no wheezes.   Abdominal: Soft. There is no tenderness.   Neurological: GCS eye subscore is 1. GCS verbal subscore is 1. GCS motor subscore is 3.   Skin: She is not diaphoretic.       Medical Decision Making/Problem List:    Active Hospital Problems    Diagnosis   • Subdural hematoma without coma, with loc of unspecified duration, initial encounter (CMS-[facility]) [S06.5X9A]     Priority: High     1.  Massive right subdural hematoma measuring 2.5 cm in thickness  2.  Additionally, 2.8 cm intra-axial right temporal hematoma and small amount   of subarachnoid hemorrhage within the right sylvian fissure  3.  Right to left shift measuring 2.1 cm with associated subfalcine herniation. "   Posterior fossa mass effect with partial effacement of the basilar cisterns and   compression of the brainstem  Given dose of factor VII as well as platelet transfusion and taken immediately to the operating room for emergent craniotomy.  Central line placed and 3% NaCl bolus started: Continue protocol  Given impending herniation, mannitol given  Continue serial neurologic assessments  CT head ordered for postop  CTA neg for aneurysm  Sedation/analgesia  Serial neuro exams  Lino Mcdowell MD. Neurosurgery.     • Thrombocytopenia concurrent with and due to alcoholism (CMS-HCC) [D69.59, F10.20]     Priority: High     Initial platelet count 41  Transfused 2 pheresis units in the operating room  Postop platelet count 53: Transfuse 1 pheresis unit  Continue to trend and transfuse if platelet count less than 100  1/31 transfused for low MA on TEG      • Elevated INR [R79.1]     Priority: High     INR 1.67 on admission labs  Due to active bleeding, transfused 1 dose of factor VII  Transfused fresh plasma the operating room  Follow-up TEGs  Trend     • Acute respiratory failure following trauma and surgery (CMS-HCC) [J95.821]     Priority: High     Intubated on arrival due to mental status  On full ventilator support secondary to head injury at this time  Optimize oxygenation and try to maintain PCO2 around 35     • Alcohol abuse [F10.10]     Priority: High     Long-standing history of alcohol abuse  SUDHIR 0.32 on arrival  Associated pancytopenia, but Normal MCV   On propofol  High likelihood of alcohol withdrawal syndrome if she recovers from head injury     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  CRITICAL CARE TIME EXCLUDING PROCEDURES:  90  Minutes  I managed intracranial hemorrhage with respiratory failure needing mechanical ventilation, coagulopathy needing transfusion platelets

## 2018-02-01 NOTE — CARE PLAN
Problem: Pain Management  Goal: Pain level will decrease to patient's comfort goal    Intervention: Follow pain managment plan developed in collaboration with patient and Interdisciplinary Team  Assess pain every two hours, administering pain meds per MAR.       Problem: Skin Integrity  Goal: Risk for impaired skin integrity will decrease    Intervention: Assess risk factors for impaired skin integrity and/or pressure ulcers  Assess skin during shift, turning every two hours, using pillows for support.

## 2018-02-01 NOTE — CARE PLAN
Problem: Ventilation Defect:  Goal: Ability to achieve and maintain unassisted ventilation or tolerate decreased levels of ventilator support  Outcome: PROGRESSING AS EXPECTED    Intervention: Support and monitor invasive and noninvasive mechanical ventilation  Adult Ventilation Update    Total Vent Days: 4    Patient Lines/Drains/Airways Status    Active Airway     Name: Placement date: Placement time: Site: Days:    Airway Group ET Tube Oral 7.0 01/29/18   1254   Oral   2               Plateau Pressure (Q Shift): 21 (02/01/18 0231)  Static Compliance (ml / cm H2O): 32 (02/01/18 0231)    Pt remains on vent with no changes overnight. Pt taken to CT and back.

## 2018-02-01 NOTE — DISCHARGE PLANNING
Medical Social Work    SW attended care conference and present was this SW, MD Gillis, bedside RN Justen, Step-father Williams, Mom Romina, Dad Ancelmo and Step-mother Harriet.     MD Gillis explained to family pt's current condition and that pt's prognosis is very guarded. MD Gillis encouraged pt's to think about pt's quality of life. Pt's family and medical staff on board with continuing aggressive treatment.     SW provided emotional support after care conference.     Plan: Will meet again with MD within the next couple of days to discuss poc.

## 2018-02-01 NOTE — CARE PLAN
Problem: Safety  Goal: Will remain free from injury    Intervention: Educate patient and significant other/support system about adaptive mobility strategies and safe transfers  Bed in lowest position, bed alarm on, room close to nurses station.         Problem: Skin Integrity  Goal: Risk for impaired skin integrity will decrease    Intervention: Assess risk factors for impaired skin integrity and/or pressure ulcers  Q2 hour repositioning with use of supportive pillows for proper skin integrity.

## 2018-02-01 NOTE — CARE PLAN
Problem: Nutritional:  Goal: Nutrition support tolerated and meeting greater than 85% of estimated needs  Outcome: MET Date Met: 02/01/18  Impact 1.5 at full goal 45 ml/hr

## 2018-02-01 NOTE — PROGRESS NOTES
Notified Dr Reddy of lab/TEG results. Does not want to transfuse platelets. No new orders received.

## 2018-02-02 NOTE — PROGRESS NOTES
Neurosurgery Progress Note    Subjective:  POD#4 Right craniectomy for SD w herniation  Platelets 59K today  Na149, still on 3% Na  No neuro changes    Exam:  R pupil 6 mm, non reactive.  L pupil 3 mm, non reactive  Withdraws to painful stim on right, otherwise decorticate on right.  No movement on left for me this AM  Crani site intact    Pulse  Av.3  Min: 85  Max: 110  Resp  Av.5  Min: 17  Max: 22  Monitored Temp  Av.2 °C (100.8 °F)  Min: 37.7 °C (99.9 °F)  Max: 38.7 °C (101.7 °F)  SpO2  Av.5 %  Min: 91 %  Max: 100 %    No Data Recorded    Recent Labs      18   1750  18   0001  18   0541   WBC  10.1  11.1*  10.9*   RBC  2.56*  2.72*  2.65*   HEMOGLOBIN  7.5*  7.8*  8.0*   HEMATOCRIT  23.0*  24.6*  24.1*   MCV  89.8  90.4  90.9   MCH  29.3  28.7  30.2   MCHC  32.6*  31.7*  33.2*   RDW  63.5*  63.8*  63.0*   PLATELETCT  52*  61*  59*   MPV  11.6  11.0  12.0     Recent Labs      18   2200  18   0001  18   0541   SODIUM  149*  150*  149*   POTASSIUM  3.6  3.6  3.6   CHLORIDE  123*  122*  121*   CO2  24  23  23   GLUCOSE  154*  148*  156*   BUN  15  15  16     Recent Labs      18   0530  18   0515   APTT  31.2   --    INR  1.52*  1.66*     Recent Labs      18   1305  18   0530  18   0515   REACTMIN  7.1  5.4  6.4   CLOTKINET  1.8  1.5  1.8   CLOTANGL  67.5  68.8  66.2   MAXCLOTS  60.2  54.3  54.4   [id number]  0.0  0.3  0.1   PRCINADP  30.5  16.4  57.2   PRCINAA  90.1  53.4  76.4       Intake/Output       18 - 18 0659 18 - 18 0659       Total  Total       Intake    I.V.  1141.9  1240.8 2382.7  --  -- --    Propofol Volume 41.9 40.8 82.7 -- -- --    IV Volume (LR ) 1100 1200 2300 -- -- --    IV Volume (3%) 0 -- 0 -- -- --    Other  180  -- 180  --  -- --    Medications (P.O./ Enteral Liquids) 180 -- 180 -- -- --    Enteral  630  570 1200  --  -- --    Enteral Volume   -- -- --    Free Water / Tube Flush 90 30 120 -- -- --    Total Intake 1951.9 1810.8 3762.7 -- -- --       Output    Urine  480  815 1295  --  -- --    Indwelling Cathether  -- -- --    Stool  --  -- --  --  -- --    Number of Times Stooled -- 1 x 1 x -- -- --    Total Output  -- -- --       Net I/O     1471.9 995.8 2467.7 -- -- --            Intake/Output Summary (Last 24 hours) at 02/02/18 0806  Last data filed at 02/02/18 0600   Gross per 24 hour   Intake           3384.8 ml   Output             1250 ml   Net           2134.8 ml            • potassium phosphate ivpb  30 mmol Once   • acetaminophen  650 mg Q4HRS PRN   • omeprazole 2 mg/mL in sodium bicarbonate  40 mg DAILY   • 3% sodium chloride  500 mL Continuous   • docusate sodium  100 mg BID    Or   • docusate sodium 100mg/10mL  100 mg BID   • etomidate   ED ONCE PRN   • succinylcholine   ED ONCE PRN   • propofol  0-80 mcg/kg/min Continuous   • Pharmacy Consult Request  1 Each PRN   • Respiratory Care per Protocol   Continuous RT   • chlorhexidine  15 mL Q12HRS   • senna-docusate  1 Tab Nightly   • senna-docusate  1 Tab Q24HRS PRN   • polyethylene glycol/lytes  1 Packet BID   • magnesium hydroxide  30 mL DAILY   • bisacodyl  10 mg Q24HRS PRN   • fleet  1 Each Once PRN   • fentaNYL   mcg Q HOUR PRN   • LR   Continuous   • ondansetron  4 mg Q4HRS PRN   • levETIRAcetam (KEPPRA) IV  500 mg BID   • hydrALAZINE  20 mg Q6HRS PRN   • enalaprilat  1.25 mg Q6HRS PRN       Assessment and Plan:  POD#4 right craniectomy for SDH w herniation   Platelet goal >50K  No further surgical interventions planned; poor prognosis per Dr. Reyes  Appreciate medical care

## 2018-02-02 NOTE — PROGRESS NOTES
Dr. Gillis notified of critical phosphorus level of <1. Orders to replace with 15mmol IV of potassium phos, and recheck in the AM. States that he will address the morning value on rounds.

## 2018-02-02 NOTE — CARE PLAN
Problem: Ventilation Defect:  Goal: Ability to achieve and maintain unassisted ventilation or tolerate decreased levels of ventilator support  Outcome: PROGRESSING AS EXPECTED    Intervention: Support and monitor invasive and noninvasive mechanical ventilation  Adult Ventilation Update    Total Vent Days: 5    Patient Lines/Drains/Airways Status    Active Airway     Name: Placement date: Placement time: Site: Days:    Airway Group ET Tube Oral 7.0 01/29/18   1254   Oral   3                  Plateau Pressure (Q Shift): 24 (02/01/18 1825)  Static Compliance (ml / cm H2O): 36.2 (02/02/18 0228)    Patient failed trials because of Barriers to Wean: No Order (per MD) (02/01/18 0382)      Pt remains on vent with no changes made overnight.

## 2018-02-02 NOTE — PROGRESS NOTES
"  Trauma/Surgical Progress Note    Author: Darío Gillis Date & Time created: 2/2/2018   9:25 AM     Interval Events:  aniscoria with non reactive pupils  GCS 5t  Full support, not a wean candidate  No ab  No lovenox  Duplex pending  Tf goal  Na 149    Hemodynamics:  Blood pressure 153/62, pulse (!) 106, temperature 37.5 °C (99.5 °F), resp. rate (!) 22, height 1.676 m (5' 6\"), weight 74.1 kg (163 lb 5.8 oz), SpO2 97 %, unknown if currently breastfeeding.     Respiratory:  Manriquez Vent Mode: APVCMV, Rate (breaths/min): 14, PEEP/CPAP: 8, FiO2: 55, P Peak (PIP): 19, P MEAN: 12 Respiration: (!) 22, Pulse Oximetry: 97 %     Work Of Breathing / Effort: Vented  RUL Breath Sounds: Crackles, RML Breath Sounds: Crackles, RLL Breath Sounds: Diminished, CHON Breath Sounds: Crackles, LLL Breath Sounds: Diminished  Fluids:    Intake/Output Summary (Last 24 hours) at 02/02/18 0925  Last data filed at 02/02/18 0600   Gross per 24 hour   Intake           3384.8 ml   Output             1250 ml   Net           2134.8 ml     Admit Weight: 56.7 kg (125 lb)  Current Weight: 74.1 kg (163 lb 5.8 oz)    Physical Exam   Eyes: Right pupil is not reactive. Left pupil is not reactive.   Cardiovascular: Normal rate.    Pulmonary/Chest: She has no wheezes.   Abdominal: Soft. There is no tenderness.   Neurological: GCS eye subscore is 1. GCS verbal subscore is 1. GCS motor subscore is 3.   Skin: She is not diaphoretic.       Medical Decision Making/Problem List:    Active Hospital Problems    Diagnosis   • Subdural hematoma without coma, with loc of unspecified duration, initial encounter (CMS-HCC) [S06.5X9A]     Priority: High     1.  Massive right subdural hematoma measuring 2.5 cm in thickness  2.  Additionally, 2.8 cm intra-axial right temporal hematoma and small amount   of subarachnoid hemorrhage within the right sylvian fissure  3.  Right to left shift measuring 2.1 cm with associated subfalcine herniation.   Posterior fossa mass effect " with partial effacement of the basilar cisterns and   compression of the brainstem  Given dose of factor VII as well as platelet transfusion and taken immediately to the operating room for emergent craniotomy.  Central line placed and 3% NaCl bolus started: Continue protocol  Given impending herniation, mannitol given  Continue serial neurologic assessments  CT head ordered for postop  CTA neg for aneurysm  Sedation/analgesia  Serial neuro exams  Lino Mcdowell MD. Neurosurgery.     • Thrombocytopenia concurrent with and due to alcoholism (CMS-HCC) [D69.59, F10.20]     Priority: High     Initial platelet count 41  Transfused 2 pheresis units in the operating room  Postop platelet count 53: Transfuse 1 pheresis unit  Continue to trend and transfuse if platelet count less than 100  1/31 transfused for low MA on TEG      • Elevated INR [R79.1]     Priority: High     INR 1.67 on admission labs  Due to active bleeding, transfused 1 dose of factor VII  Transfused fresh plasma the operating room  Follow-up TEGs  Trend     • Acute respiratory failure following trauma and surgery (CMS-HCC) [J95.821]     Priority: High     Intubated on arrival due to mental status  On full ventilator support secondary to head injury at this time  Optimize oxygenation and try to maintain PCO2 around 35     • Alcohol abuse [F10.10]     Priority: High     Long-standing history of alcohol abuse  SUDHIR 0.32 on arrival  Associated pancytopenia, but Normal MCV   On propofol  High likelihood of alcohol withdrawal syndrome if she recovers from head injury     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.30]     Priority: High     Diagnoses documented on a prior admission  Mild elevation of AST  Somewhat abnormal coagulation function  Dustin coags/LFTs       Core Measures & Quality Metrics:  Labs reviewed and Medications reviewed  Lee catheter: Critically Ill - Requiring Accurate Measurement of Urinary Output      DVT Prophylaxis: Contraindicated - High  bleeding risk  DVT prophylaxis - mechanical: SCDs  Ulcer prophylaxis: Yes        ANALIA Score  Discussed patient condition with RN, RT, Pharmacy and Dietary.  CRITICAL CARE TIME EXCLUDING PROCEDURES: 40    Minutes managing mechanical ventilation , no wean due to coma, hypertonic drip with frequent lab and reevaluation,

## 2018-02-03 NOTE — PROGRESS NOTES
Neurosurgery Progress Note    Subjective:  POD#5 Right craniectomy for SD w herniation  Platelets 74K today  Na147      Exam:  R pupil 6 mm, non reactive.  L pupil 3 mm, non reactive  Minimal extension with the right upper extremity, less extension with the left upper extremity      Pulse  Av  Min: 83  Max: 103  Monitored Temp  Av.1 °C (100.6 °F)  Min: 37.5 °C (99.5 °F)  Max: 38.6 °C (101.5 °F)  SpO2  Av.7 %  Min: 86 %  Max: 100 %    No Data Recorded    Recent Labs      18   0541  18   1735  18   0612   WBC  10.9*  9.6  9.9   RBC  2.65*  2.77*  2.68*   HEMOGLOBIN  8.0*  8.2*  7.7*   HEMATOCRIT  24.1*  25.6*  25.1*   MCV  90.9  92.4  93.7   MCH  30.2  29.6  28.7   MCHC  33.2*  32.0*  30.7*   RDW  63.0*  65.2*  67.3*   PLATELETCT  59*  69*  74*   MPV  12.0  11.3  11.0     Recent Labs      18   0541  18   1735  18   0612   SODIUM  149*  150*  147*   POTASSIUM  3.6  3.6  3.5*   CHLORIDE  121*  122*  119*   CO2  23  21  22   GLUCOSE  156*  146*  123*   BUN  16  17  19     Recent Labs      18   0515   INR  1.66*     Recent Labs      18   0515   REACTMIN  6.4   CLOTKINET  1.8   CLOTANGL  66.2   MAXCLOTS  54.4   [id number]  0.1   PRCINADP  57.2   PRCINAA  76.4       Intake/Output       18 - 18 0659 18 - 18 0659      [id number] Total 2082-2812 7429-0848 Total       Intake    I.V.  1242.8  1279.5 2522.3  --  -- --    Propofol Volume 42.8 79.5 122.3 -- -- --    IV Volume (LR ) 1200 1200 2400 -- -- --    Enteral  540  540 1080  --  -- --    Enteral Volume  -- -- --    Total Intake 1782.8 1819.5 3602.3 -- -- --       Output    Urine  829  570 1399  --  -- --    Indwelling Cathether  -- -- --    Stool  --  -- --  --  -- --    Number of Times Stooled 1 x 2 x 3 x -- -- --    Total Output  -- -- --       Net I/O     953.8 1249.5 2203.3 -- -- --            Intake/Output Summary (Last 24 hours) at  02/03/18 0905  Last data filed at 02/03/18 0600   Gross per 24 hour   Intake          3305.49 ml   Output             1287 ml   Net          2018.49 ml            • potassium phosphate ivpb  30 mmol Once   • acetaminophen  650 mg Q4HRS PRN   • omeprazole 2 mg/mL in sodium bicarbonate  40 mg DAILY   • 3% sodium chloride  500 mL Continuous   • docusate sodium  100 mg BID    Or   • docusate sodium 100mg/10mL  100 mg BID   • etomidate   ED ONCE PRN   • succinylcholine   ED ONCE PRN   • propofol  0-80 mcg/kg/min Continuous   • Pharmacy Consult Request  1 Each PRN   • Respiratory Care per Protocol   Continuous RT   • chlorhexidine  15 mL Q12HRS   • senna-docusate  1 Tab Nightly   • senna-docusate  1 Tab Q24HRS PRN   • polyethylene glycol/lytes  1 Packet BID   • magnesium hydroxide  30 mL DAILY   • bisacodyl  10 mg Q24HRS PRN   • fleet  1 Each Once PRN   • fentaNYL   mcg Q HOUR PRN   • LR   Continuous   • ondansetron  4 mg Q4HRS PRN   • levETIRAcetam (KEPPRA) IV  500 mg BID   • hydrALAZINE  20 mg Q6HRS PRN   • enalaprilat  1.25 mg Q6HRS PRN       Assessment and Plan:  POD#5 right craniectomy for SDH w herniation   Normalized platelets and sodium  Neurologic exam has not changed. I've discussed with the family that they should think about either moving forward with comfort care or trach and PEG sometime in the near future. I have conveyed that I think she is likely to be vegetative for quite some time, possibly permanently.

## 2018-02-03 NOTE — PROGRESS NOTES
Tech from Lab called with critical result of Phos at 1.0. Critical lab result read back to tech.   Dr. Gillis notified of critical lab result at 0730.  Critical lab result read back by Dr. Gillis.

## 2018-02-03 NOTE — PROGRESS NOTES
"  Trauma/Surgical Progress Note    Author: Darío Gillis Date & Time created: 2/3/2018   9:30 AM     Interval Events:  gcs 4t  Fixed pupils  Has cough and gag  Intermittent hypertension  Edema noted  Tf goal, bm noted  plts count rising  tmax 101   Replace phos.   Hemodynamics:  Blood pressure 153/62, pulse 98, temperature 37.5 °C (99.5 °F), resp. rate (!) 22, height 1.676 m (5' 6\"), weight 76.1 kg (167 lb 12.3 oz), SpO2 94 %, unknown if currently breastfeeding.     Respiratory:  Manriquez Vent Mode: APVCMV, Rate (breaths/min): 14, PEEP/CPAP: 8, FiO2: 55, P Peak (PIP): 28, P MEAN: 15 Pulse Oximetry: 94 %     Work Of Breathing / Effort: Vented  RUL Breath Sounds: Crackles, RML Breath Sounds: Diminished, RLL Breath Sounds: Diminished, CHON Breath Sounds: Crackles, LLL Breath Sounds: Diminished  Fluids:    Intake/Output Summary (Last 24 hours) at 02/03/18 0930  Last data filed at 02/03/18 0800   Gross per 24 hour   Intake          4192.49 ml   Output             1437 ml   Net          2755.49 ml     Admit Weight: 56.7 kg (125 lb)  Current Weight: 76.1 kg (167 lb 12.3 oz)    Physical Exam   Eyes: Right pupil is not reactive. Left pupil is not reactive.   Cardiovascular: Normal rate.    Pulmonary/Chest: She has no wheezes.   Abdominal: Soft. There is no tenderness.   Neurological: GCS eye subscore is 1. GCS verbal subscore is 1. GCS motor subscore is 3.   Skin: She is not diaphoretic.       Medical Decision Making/Problem List:    Active Hospital Problems    Diagnosis   • Subdural hematoma without coma, with loc of unspecified duration, initial encounter (CMS-[facility]) [S06.5X9A]     Priority: High     1.  Massive right subdural hematoma measuring 2.5 cm in thickness  2.  Additionally, 2.8 cm intra-axial right temporal hematoma and small amount   of subarachnoid hemorrhage within the right sylvian fissure  3.  Right to left shift measuring 2.1 cm with associated subfalcine herniation.   Posterior fossa mass effect with partial " effacement of the basilar cisterns and   compression of the brainstem  Given dose of factor VII as well as platelet transfusion and taken immediately to the operating room for emergent craniotomy.  Central line placed and 3% NaCl bolus started: Continue protocol  Given impending herniation, mannitol given  Continue serial neurologic assessments  CT head ordered for postop  CTA neg for aneurysm  Sedation/analgesia  Serial neuro exams  Lino Mcdowell MD. Neurosurgery.     • Thrombocytopenia concurrent with and due to alcoholism (CMS-HCC) [D69.59, F10.20]     Priority: High     Initial platelet count 41  Transfused 2 pheresis units in the operating room  Postop platelet count 53: Transfuse 1 pheresis unit  Continue to trend and transfuse if platelet count less than 100  1/31 transfused for low MA on TEG      • Elevated INR [R79.1]     Priority: High     INR 1.67 on admission labs  Due to active bleeding, transfused 1 dose of factor VII  Transfused fresh plasma the operating room  Follow-up TEGs  Trend     • Acute respiratory failure following trauma and surgery (CMS-HCC) [J95.821]     Priority: High     Intubated on arrival due to mental status  On full ventilator support secondary to head injury at this time  Optimize oxygenation and try to maintain PCO2 around 35     • Alcohol abuse [F10.10]     Priority: High     Long-standing history of alcohol abuse  SUDHIR 0.32 on arrival  Associated pancytopenia, but Normal MCV   On propofol  High likelihood of alcohol withdrawal syndrome if she recovers from head injury     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  CRITICAL CARE TIME EXCLUDING PROCEDURES: 35   Minutes managing mechanical ventilation , hypertonic saline drip, propofol drip

## 2018-02-03 NOTE — CARE PLAN
Problem: Pain  Goal: Alleviation of Pain or a reduction in pain to the patient's comfort goal    Intervention: Pain Management--Medications  Use pharmacological and nonpharmacological methods to control pain.       Problem: Hemodynamic Status  Goal: Vital Signs and Fluid Balance Management  SBP goal of < 160, decrease stimulus, prn BP meds as ordered.

## 2018-02-03 NOTE — CARE PLAN
Problem: Ventilation Defect:  Goal: Ability to achieve and maintain unassisted ventilation or tolerate decreased levels of ventilator support  Outcome: PROGRESSING AS EXPECTED    Intervention: Support and monitor invasive and noninvasive mechanical ventilation  Adult Ventilation Update    Total Vent Days: 6    Patient Lines/Drains/Airways Status    Active Airway     Name: Placement date: Placement time: Site: Days:    Airway Group ET Tube Oral 7.0 01/29/18   1254   Oral   4               Plateau Pressure (Q Shift): 23 (02/02/18 1755)  Static Compliance (ml / cm H2O): 27 (02/03/18 0234)    Pt remains on vent with no changes made overnight.

## 2018-02-03 NOTE — PROGRESS NOTES
Dr. Gillis at bedside, updated on pt status. Neurologically the same, pupils are fixed, pt does not follow commands, and has abnormal extension with painful stimuli. GCS 4. No orders received.

## 2018-02-04 NOTE — PROGRESS NOTES
"  Trauma/Surgical Progress Note    Author: Darío Gillis Date & Time created: 2/4/2018   9:23 AM     Interval Events:  Fio2, at 50  Not a wean candidate  Tf goal  Antibiotics no  uo adequate  tmax 101.7  Secretions small via endotracheal tube    Hemodynamics:  Blood pressure 153/62, pulse 97, temperature 37.5 °C (99.5 °F), resp. rate (!) 22, height 1.676 m (5' 6\"), weight 76.7 kg (169 lb 1.5 oz), SpO2 94 %, unknown if currently breastfeeding.     Respiratory:  Manriquze Vent Mode: APVCMV, Rate (breaths/min): 14, PEEP/CPAP: 8, FiO2: 50, P Peak (PIP): 16, P MEAN: 10 Pulse Oximetry: 94 %     Work Of Breathing / Effort: Vented  RUL Breath Sounds: Clear, RML Breath Sounds: Diminished, RLL Breath Sounds: Diminished, CHON Breath Sounds: Clear, LLL Breath Sounds: Diminished  Fluids:    Intake/Output Summary (Last 24 hours) at 02/04/18 0923  Last data filed at 02/04/18 0800   Gross per 24 hour   Intake          3780.33 ml   Output             2150 ml   Net          1630.33 ml     Admit Weight: 56.7 kg (125 lb)  Current Weight: 76.7 kg (169 lb 1.5 oz)    Physical Exam   Eyes: Right pupil is not reactive. Left pupil is not reactive.   Cardiovascular: Normal rate.    Pulmonary/Chest: She has no wheezes.   Abdominal: Soft. There is no tenderness.   Neurological: GCS eye subscore is 1. GCS verbal subscore is 1. GCS motor subscore is 3.   Skin: She is not diaphoretic.       Medical Decision Making/Problem List:    Active Hospital Problems    Diagnosis   • Subdural hematoma without coma, with loc of unspecified duration, initial encounter (CMS-[facility]) [S06.5X9A]     Priority: High     1.  Massive right subdural hematoma measuring 2.5 cm in thickness  2.  Additionally, 2.8 cm intra-axial right temporal hematoma and small amount   of subarachnoid hemorrhage within the right sylvian fissure  3.  Right to left shift measuring 2.1 cm with associated subfalcine herniation.   Posterior fossa mass effect with partial effacement of the basilar " cisterns and   compression of the brainstem  Given dose of factor VII as well as platelet transfusion and taken immediately to the operating room for emergent craniotomy.  Central line placed and 3% NaCl bolus started: Continue protocol  Given impending herniation, mannitol given  CTA: no aneurysm  2/3 GCS 4 fixed pupils, poor prognosis  Lino Mcdowell MD. Neurosurgery.     • Thrombocytopenia concurrent with and due to alcoholism (CMS-HCC) [D69.59, F10.20]     Priority: High     Initial platelet count 41  Transfused 2 pheresis units in the operating room  Postop platelet count 53: Transfuse 1 pheresis unit  Continue to trend and transfuse if platelet count less than 100  1/31 transfused for low MA on TEG      • Elevated INR [R79.1]     Priority: High     INR 1.67 on admission labs  Due to active bleeding, transfused 1 dose of factor VII  Transfused fresh plasma the operating room  Follow-up TEGs  Trend     • Acute respiratory failure following trauma and surgery (CMS-HCC) [J95.821]     Priority: High     Intubated on arrival due to mental status  On full ventilator support secondary to head injury at this time  Optimize oxygenation and try to maintain PCO2 around 35     • Alcohol abuse [F10.10]     Priority: High     Long-standing history of alcohol abuse  SUDHIR 0.32 on arrival  Associated pancytopenia, but Normal MCV   On propofol  High likelihood of alcohol withdrawal syndrome if she recovers from head injury     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  CRITICAL CARE TIME EXCLUDING PROCEDURES: 35 minutes managing intracranial hemorrhage needing mechanical ventilation, propofol drip, malnutrition and continuous tube feeds
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
## 2018-02-04 NOTE — PROGRESS NOTES
Neurosurgery Progress Note    Subjective:  POD#6 Right craniectomy for SD w herniation  Platelets 71K today  Na145      Exam:  R pupil 6 mm, non reactive.  L pupil 3 mm, non reactive  Minimal extension with the right upper extremity, less extension with the left upper extremity      BP  Min: 129/57  Max: 135/61  Pulse  Av.5  Min: 88  Max: 107  Resp  Av  Min: 24  Max: 24  Temp  Av.7 °C (101.7 °F)  Min: 38.7 °C (101.7 °F)  Max: 38.7 °C (101.7 °F)  Monitored Temp  Av.7 °C (101.6 °F)  Min: 38.3 °C (100.9 °F)  Max: 39.4 °C (102.9 °F)  SpO2  Av.8 %  Min: 92 %  Max: 98 %    No Data Recorded    Recent Labs      18   0618   0605   WBC  9.9  8.9  11.8*   RBC  2.68*  2.67*  2.31*   HEMOGLOBIN  7.7*  7.8*  6.9*   HEMATOCRIT  25.1*  25.5*  22.1*   MCV  93.7  95.5  95.7   MCH  28.7  29.2  29.9   MCHC  30.7*  30.6*  31.2*   RDW  67.3*  69.7*  71.0*   PLATELETCT  74*  80*  95*   MPV  11.0  10.6  12.0     Recent Labs      18   0618   0605   SODIUM  147*  147*  145   POTASSIUM  3.5*  3.8  3.9   CHLORIDE  119*  120*  119*   CO2  22  20  21   GLUCOSE  123*  130*  120*   BUN  19  20  22               Intake/Output       18 - 18 0618 - 18 0659       Total  Total       Intake    I.V.  1776.9  1363.2 3140.1  454.4  -- 454.4    Propofol Volume 126.9 163.2 290.1 54.4 -- 54.4    IV Piggyback Volume (IV Piggyback) 650 -- 650 -- -- --    IV Volume (LR ) 1000 1200 2200 400 -- 400    Other  30  -- 30  70  -- 70    Medications (P.O./ Enteral Liquids) 30 -- 30 70 -- 70    Enteral  540  540 1080  240  -- 240    Enteral Volume  180 -- 180    Free Water / Tube Flush -- -- -- 60 -- 60    Total Intake 2346.9 1903.2 4250.1 764.4 -- 764.4       Output    Urine  770  470 1240  185  -- 185    Indwelling Cathether  185 -- 185    Stool/Urine  --  1000 1000  --  -- --     Measurable Stool (ml) -- 1000 1000 -- -- --    Stool  --  -- --  --  -- --    Number of Times Stooled 1 x -- 1 x -- -- --    Total Output 770 1470 2240 185 -- 185       Net I/O     1576.9 433.2 2010.1 579.4 -- 579.4            Intake/Output Summary (Last 24 hours) at 02/04/18 1021  Last data filed at 02/04/18 1000   Gross per 24 hour   Intake          3810.33 ml   Output             2175 ml   Net          1635.33 ml            • acetaminophen  650 mg Q4HRS PRN   • omeprazole 2 mg/mL in sodium bicarbonate  40 mg DAILY   • 3% sodium chloride  500 mL Continuous   • docusate sodium  100 mg BID    Or   • docusate sodium 100mg/10mL  100 mg BID   • propofol  0-80 mcg/kg/min Continuous   • Pharmacy Consult Request  1 Each PRN   • Respiratory Care per Protocol   Continuous RT   • chlorhexidine  15 mL Q12HRS   • senna-docusate  1 Tab Nightly   • senna-docusate  1 Tab Q24HRS PRN   • polyethylene glycol/lytes  1 Packet BID   • magnesium hydroxide  30 mL DAILY   • bisacodyl  10 mg Q24HRS PRN   • fleet  1 Each Once PRN   • fentaNYL   mcg Q HOUR PRN   • LR   Continuous   • ondansetron  4 mg Q4HRS PRN   • levETIRAcetam (KEPPRA) IV  500 mg BID   • hydrALAZINE  20 mg Q6HRS PRN   • enalaprilat  1.25 mg Q6HRS PRN       Assessment and Plan:  POD#6 right craniectomy for SDH w herniation   Normalized platelets and sodium  Neurologic exam has not changed.  Will order MRI  Poor prognosis

## 2018-02-04 NOTE — CARE PLAN
Problem: Safety  Goal: Will remain free from injury  Outcome: PROGRESSING AS EXPECTED  Will remain free from injury  Intervention: Provide assistance with mobility  Assistance of 2 or more for turning/repositioning  Intervention: Educate patient and significant other/support system about adaptive mobility strategies and safe transfers  Education provided to family at bedside    Goal: Will remain free from falls  Outcome: PROGRESSING AS EXPECTED  Fall precautions in place  Intervention: Assess risk factors for falls  Risk of falls; fall precautions in place      Problem: Venous Thromboembolism (VTW)/Deep Vein Thrombosis (DVT) Prevention:  Goal: Patient will participate in Venous Thrombosis (VTE)/Deep Vein Thrombosis (DVT)Prevention Measures  Outcome: PROGRESSING AS EXPECTED  SCD in place; contraindicated on Pharmacological Prophylaxis  Intervention: Assess and monitor for anticoagulation complications  Monitor for s/s   Intervention: Encourage ambulation/mobilization at level directed by Physical Therapy in collaboration with Interdisciplinary Team  Bedrest

## 2018-02-04 NOTE — CARE PLAN
Problem: Mechanical Ventilation  Goal: Safe management of artificial airway and ventilation  Outcome: PROGRESSING AS EXPECTED    Intervention: Suctioning and care of ET/Trach tube  Continuous pulse oximetry in use. Collaboration with RT. Head of bed elevated. chlorahexadine oral solution administered. Pt suctioned as needed. Oral care provided Q 2 hours. Peptic ulcer prophylaxis in use. Sequentials in use. ambu bag at bedside.       Problem: Hemodynamic Status  Goal: Vital Signs and Fluid Balance Management  Outcome: PROGRESSING AS EXPECTED    Intervention: Hemodynamic Monitoring  Continuous pulse oximetry in use. Continuous cardiac monitoring in use. Cardiac leads changed. Art line calibrated. Art line correlating with blood pressure cuff. I/O's monitored. Daily weights taken.

## 2018-02-05 NOTE — CARE PLAN
Problem: Ventilation Defect:  Goal: Ability to achieve and maintain unassisted ventilation or tolerate decreased levels of ventilator support    Intervention: Support and monitor invasive and noninvasive mechanical ventilation  Adult Ventilation Update    Total Vent Days: 8    Patient Lines/Drains/Airways Status    Active Airway     Name: Placement date: Placement time: Site: Days:    Airway Group ET Tube Oral 7.0 @ 24 01/29/18   1254   Oral   8              apvcmv 14 340 +8 50%    Events/Summary/Plan: Patient stable on vent, no changes made this shift, will cont to monitor.

## 2018-02-05 NOTE — PROGRESS NOTES
Neurosurgery Progress Note    Subjective:  POD#7 Right craniectomy for SD w herniation  Platelets 110K today  Na145  No major changes overnight   Brain MRI shows midbrain swelling with likely infarct with generalized surgical site swelling and some mild generalized bleeds      Exam:  R pupil 6 mm, non reactive.  L pupil 3 mm, non reactive  Minimal extension with the right upper extremity, less extension with the left upper extremity      BP  Min: 97/48  Max: 135/53  Pulse  Av.9  Min: 81  Max: 107  Resp  Av  Min: 24  Max: 24  Temp  Av.7 °C (101.7 °F)  Min: 38.6 °C (101.5 °F)  Max: 38.7 °C (101.7 °F)  Monitored Temp  Av.4 °C (101.1 °F)  Min: 37.5 °C (99.5 °F)  Max: 38.7 °C (101.7 °F)  SpO2  Av.2 %  Min: 91 %  Max: 99 %    No Data Recorded    Recent Labs      18   0605  18   1800  18   0602   WBC  11.8*  15.4*  16.4*   RBC  2.31*  3.11*  3.18*   HEMOGLOBIN  6.9*  9.2*  9.4*   HEMATOCRIT  22.1*  29.7*  30.3*   MCV  95.7  95.5  95.3   MCH  29.9  29.6  29.6   MCHC  31.2*  31.0*  31.0*   RDW  71.0*  67.3*  68.8*   PLATELETCT  95*  99*  110*   MPV  12.0  11.5  11.2     Recent Labs      18   0605  18   1800  18   0602   SODIUM  145  143  145   POTASSIUM  3.9  3.7  3.5*   CHLORIDE  119*  115*  116*   CO2  21  21  22   GLUCOSE  120*  121*  110*   BUN  22  22  19               Intake/Output       18 - 1859 18 - 1859       Total [id number] Total       Intake    I.V.  1319.9  1281.6 2601.5  --  -- --    Propofol Volume 119.9 81.6 201.5 -- -- --    IV Volume (LR ) 1200 1200 2400 -- -- --    Blood  448  -- 448  --  -- --    Volume (RELEASE RED BLOOD CELLS) 448 -- 448 -- -- --    Other  70  -- 70  --  -- --    Medications (P.O./ Enteral Liquids) 70 -- 70 -- -- --    Enteral  600  405 1005  --  -- --    Enteral Volume 540 405 945 -- -- --    Free Water / Tube Flush 60 -- 60 -- -- --    Total Intake 2437.9  1686.6 4124.5 -- -- --       Output    Urine  445  540 985  --  -- --    Indwelling Cathether 445 540 985 -- -- --    Stool  --  -- --  --  -- --    Number of Times Stooled -- 1 x 1 x -- -- --    Total Output 445 540 985 -- -- --       Net I/O     1992.9 1146.6 3139.5 -- -- --            Intake/Output Summary (Last 24 hours) at 02/05/18 0821  Last data filed at 02/05/18 0600   Gross per 24 hour   Intake          3707.31 ml   Output              925 ml   Net          2782.31 ml            • acetaminophen  650 mg Q4HRS PRN   • omeprazole 2 mg/mL in sodium bicarbonate  40 mg DAILY   • 3% sodium chloride  500 mL Continuous   • docusate sodium  100 mg BID    Or   • docusate sodium 100mg/10mL  100 mg BID   • propofol  0-80 mcg/kg/min Continuous   • Pharmacy Consult Request  1 Each PRN   • Respiratory Care per Protocol   Continuous RT   • chlorhexidine  15 mL Q12HRS   • senna-docusate  1 Tab Nightly   • senna-docusate  1 Tab Q24HRS PRN   • polyethylene glycol/lytes  1 Packet BID   • magnesium hydroxide  30 mL DAILY   • bisacodyl  10 mg Q24HRS PRN   • fleet  1 Each Once PRN   • fentaNYL   mcg Q HOUR PRN   • LR   Continuous   • ondansetron  4 mg Q4HRS PRN   • levETIRAcetam (KEPPRA) IV  500 mg BID   • hydrALAZINE  20 mg Q6HRS PRN   • enalaprilat  1.25 mg Q6HRS PRN       Assessment and Plan:  POD#7 right craniectomy for SDH w herniation   Normalized platelets and sodium  Neurologic exam has not changed - poor  Poor prognosis
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
## 2018-02-05 NOTE — CARE PLAN
Problem: Ventilation Defect:  Goal: Ability to achieve and maintain unassisted ventilation or tolerate decreased levels of ventilator support  Outcome: PROGRESSING SLOWER THAN EXPECTED    Intervention: Support and monitor invasive and noninvasive mechanical ventilation  Vent day 8

## 2018-02-05 NOTE — CARE PLAN
Problem: Ventilation Defect:  Goal: Ability to achieve and maintain unassisted ventilation or tolerate decreased levels of ventilator support  Outcome: PROGRESSING SLOWER THAN EXPECTED    Intervention: Support and monitor invasive and noninvasive mechanical ventilation  Vent day 7

## 2018-02-06 NOTE — CARE PLAN
Problem: Safety  Goal: Will remain free from injury  Outcome: PROGRESSING AS EXPECTED  Pt educated, fall precautions in place, side rails x3, restraints in place.     Problem: Infection  Goal: Will remain free from infection  Outcome: PROGRESSING AS EXPECTED  Pt educated, standard precautions, VAP bundle, hand hygiene in place.

## 2018-02-06 NOTE — CARE PLAN
Problem: Ventilation Defect:  Goal: Ability to achieve and maintain unassisted ventilation or tolerate decreased levels of ventilator support    Intervention: Support and monitor invasive and noninvasive mechanical ventilation  Adult Ventilation Update    Total Vent Days: 7    Patient Lines/Drains/Airways Status    Active Airway     Name: Placement date: Placement time: Site: Days:    Airway Group ET Tube Oral 7.0 01/29/18   1254   Oral   8               Plateau Pressure (Q Shift): 21 (02/06/18 0700)  Static Compliance (ml / cm H2O): 25 (02/06/18 1444)    Patient failed trials because of Barriers to Wean: No Order (02/06/18 0800)  Barriers to SBT    Length of Weaning Trial Length of Weaning Trial (Hours): 0 (02/06/18 0800)    Cough: Productive (02/06/18 1444)  Sputum Amount: Moderate (02/06/18 1444)  Sputum Color: Yellow (02/06/18 1444)  Sputum Consistency: Thin (02/06/18 1444)    Mobility Group  Activity Performed: Unable to mobilize (02/06/18 1200)  Assistance / Tolerance: Assistance of Two or More (02/05/18 1600)  Pt Calls for Assistance: No (02/05/18 1600)  Staff Present for Mobilization: RN (02/05/18 1600)  Assistive Devices: None (02/05/18 2000)  Reason Not Mobilized: Bed rest (02/06/18 1200)  Mobilization Comments: FiO2 60%; no bone flap to right side of head (02/05/18 2000)    Events/Summary/Plan: Pt continues on 80% Fi02 and peep 12 (02/06/18 1444)
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
## 2018-02-06 NOTE — CARE PLAN
Problem: Ventilation Defect:  Goal: Ability to achieve and maintain unassisted ventilation or tolerate decreased levels of ventilator support    Intervention: Support and monitor invasive and noninvasive mechanical ventilation  Adult Ventilation Update    Total Vent Days: 9    Patient Lines/Drains/Airways Status    Active Airway     Name: Placement date: Placement time: Site: Days:    Airway Group ET Tube Oral 7.0 01/29/18   1254   Oral   9              Barriers to Wean: Intracranial Hypertension (02/05/18 1509)    Cough: Productive (02/06/18 0136)  Sputum Amount: (P) Small;Moderate (02/06/18 0136)  Sputum Color: Clear;White;Yellow (02/06/18 0136)  Sputum Consistency: Thick;Thin (02/06/18 0136)

## 2018-02-06 NOTE — PROGRESS NOTES
Dr. Terrazas at bedside. Updated step father, Williams, on patients status. All questions answered.     Changed Na+ goal to 135 - 145.

## 2018-02-06 NOTE — PALLIATIVE CARE
Palliative Care follow-up  Met with mom, Aida and AuntMinal at bedside.  Introduced myself as Palliative Care and provided information for the patient's children.  Family very thankful for the medical teams support.    All questions answered in full, family meeting with [facility] today, confirmed that they have Palliative Care's contact number and encouraged them to call for questions, support.      Updated: Mary Grace. ALANNA, ESTRADA RN and Palliative Care team.      Plan: Donor    Thank you for allowing Palliative Care to participate in this patient's care. Please feel free to call [phone] with any questions or concerns.

## 2018-02-06 NOTE — PALLIATIVE CARE
Palliative Care follow-up  Dr. Kitchen had a long conversation with family today at bedside.  Collaboration with BS RN, [facility], PERRY Haynes.  Family has decided on Donation,  Meeting tomorrow 2/6/18 @ 10am.  Will offer support for family and children.      Updated: PERRY Haynes, Mary Grace, [facility], and BS RN.     Plan: [facility] 2/6/18    Thank you for allowing Palliative Care to participate in this patient's care. Please feel free to call [phone] with any questions or concerns.

## 2018-02-06 NOTE — CARE PLAN
Problem: Infection  Goal: Will remain free from infection  Standard precautions in place with proper hand hygiene. VAP, CLABSI and CAUTI prevention in place. CHG bath given. Monitoring trends in labs, MEWS and VS. Monitoring LDAs and sx sites for s/s infection.     Problem: Bowel/Gastric:  Goal: Normal bowel function is maintained or improved  TF at goal per MD order. Patient tolerating TF. Bowel protocol in place. Last BM 2/5    Problem: Hemodynamic Status  Goal: Vital Signs and Fluid Balance Management  Continuous hemodynamic monitoring in place. Maintaining hemodynamic parameters per MD.

## 2018-02-06 NOTE — CARE PLAN
Problem: Safety  Goal: Will remain free from injury    Intervention: Provide assistance with mobility  Bed rest.      Problem: Venous Thromboembolism (VTW)/Deep Vein Thrombosis (DVT) Prevention:  Goal: Patient will participate in Venous Thrombosis (VTE)/Deep Vein Thrombosis (DVT)Prevention Measures    Intervention: Ensure patient wears graduated elastic stockings (ARELI hose) and/or SCDs, if ordered, when in bed or chair (Remove at least once per shift for skin check)  SCDs on.

## 2018-02-06 NOTE — PROGRESS NOTES
Neurosurgery Progress Note    Subjective:  POD#8 Right craniectomy for SD w herniation  Dr. Reyes met with family. He has discussed the prognosis with the family.      Exam:  R pupil 6 mm, non reactive.  L pupil 3 mm, non reactive  Minimal extension with the right upper extremity, less extension with the left upper extremity      Pulse  Av.1  Min: 67  Max: 107  Resp  Avg: 15.8  Min: 14  Max: 20  Monitored Temp  Av.6 °C (99.6 °F)  Min: 36.6 °C (97.9 °F)  Max: 38.4 °C (101.1 °F)  SpO2  Av %  Min: 91 %  Max: 98 %    No Data Recorded    Recent Labs      18   0602  18   1800  18   06   WBC  16.4*  17.9*  21.9*   RBC  3.18*  3.19*  3.20*   HEMOGLOBIN  9.4*  9.6*  9.6*   HEMATOCRIT  30.3*  30.3*  30.6*   MCV  95.3  95.0  95.6   MCH  29.6  30.1  30.0   MCHC  31.0*  31.7*  31.4*   RDW  68.8*  70.8*  75.8*   PLATELETCT  110*  117*  130*   MPV  11.2  11.1  12.1     Recent Labs      18   0602  18   1800  18   0600   SODIUM  145  141  141   POTASSIUM  3.5*  3.9  3.7   CHLORIDE  116*  114*  113*   CO2  22  20  20   GLUCOSE  110*  138*  116*   BUN  19  19  22               Intake/Output       18 - 18 0659 18 07 - 18 0659       Total [id number] Total       Intake    I.V.  1302  1341 2643  --  -- --    Propofol Volume 102 141 243 -- -- --    IV Volume (LR ) 1200 1200 2400 -- -- --    Other  30  30 60  --  -- --    Medications (P.O./ Enteral Liquids) 30 30 60 -- -- --    Enteral  690  630 1320  --  -- --    Enteral Volume  -- -- --    Free Water / Tube Flush 150 90 240 -- -- --    Total Intake 2022 -- -- --       Output    Urine  495  540 1035  --  -- --    Indwelling Cathether  -- -- --    Stool  --  -- --  --  -- --    Number of Times Stooled 1 x -- 1 x -- -- --    Total Output  -- -- --       Net I/O     3410 9494 6267 -- -- --            Intake/Output Summary (Last 24  hours) at 02/06/18 0838  Last data filed at 02/06/18 0600   Gross per 24 hour   Intake          3659.36 ml   Output              975 ml   Net          2684.36 ml            • amLODIPine  5 mg Q DAY   • metoprolol  25 mg TWICE DAILY   • acetaminophen  650 mg Q4HRS PRN   • omeprazole 2 mg/mL in sodium bicarbonate  40 mg DAILY   • 3% sodium chloride  500 mL Continuous   • docusate sodium  100 mg BID    Or   • docusate sodium 100mg/10mL  100 mg BID   • propofol  0-80 mcg/kg/min Continuous   • Pharmacy Consult Request  1 Each PRN   • Respiratory Care per Protocol   Continuous RT   • chlorhexidine  15 mL Q12HRS   • senna-docusate  1 Tab Nightly   • senna-docusate  1 Tab Q24HRS PRN   • polyethylene glycol/lytes  1 Packet BID   • magnesium hydroxide  30 mL DAILY   • bisacodyl  10 mg Q24HRS PRN   • fleet  1 Each Once PRN   • fentaNYL   mcg Q HOUR PRN   • LR   Continuous   • ondansetron  4 mg Q4HRS PRN   • hydrALAZINE  20 mg Q6HRS PRN   • enalaprilat  1.25 mg Q6HRS PRN       Assessment and Plan:  POD#8 right craniectomy for SDH w herniation   Dr. Reyes met with family and discussed the prognosis for this patient with them. They understand. They are planning for comfort care today.  Contact [facility] prn

## 2018-02-07 PROBLEM — D72.829 LEUKOCYTOSIS: Status: ACTIVE | Noted: 2018-01-01

## 2018-02-07 NOTE — CARE PLAN
Problem: Ventilation Defect:  Goal: Ability to achieve and maintain unassisted ventilation or tolerate decreased levels of ventilator support    Intervention: Support and monitor invasive and noninvasive mechanical ventilation  Adult Ventilation Update    Total Vent Days: 10    Patient Lines/Drains/Airways Status    Active Airway     Name: Placement date: Placement time: Site: Days:    Airway Group ET Tube Oral 7.0 01/29/18   1254   Oral   10              Barriers to Wean: No Order (02/06/18 0800)    Cough: Non Productive (02/07/18 0154)  Sputum Amount: Scant (02/07/18 0200)  Sputum Color: Clear;Yellow (02/07/18 0200)  Sputum Consistency: Thin (02/07/18 0200)    Mobility Group  Activity Performed: Unable to mobilize (02/06/18 2000)  Pt Calls for Assistance: No (02/06/18 2000)  Reason Not Mobilized: Unstable condition (02/06/18 2000)  Mobilization Comments: FiO2 80% (02/06/18 2000)    Events/Summary/Plan: increased rate to 20 post abg (02/07/18 0154)

## 2018-02-07 NOTE — CARE PLAN
Problem: Pain Management  Goal: Pain level will decrease to patient's comfort goal  Outcome: PROGRESSING AS EXPECTED  Assessing patient's pain q2h. Medicated as needed with PRNs, currently on fentanyl gtt, see MAR.    Problem: Skin Integrity  Goal: Risk for impaired skin integrity will decrease  Outcome: PROGRESSING AS EXPECTED  Repositioning patient q2h. Pillows used for turning. See wound LDA.

## 2018-02-07 NOTE — PROGRESS NOTES
Call placed to Dr. Kitchen for updates on patient's respiratory status, increased oxygen demands and inadequate UOP. Telephone order received to give 1L LR bolus at this time. Discussed patient's hemodynamic status. Order implemented. Dr. Kitchen aware of patient's low UOP.

## 2018-02-07 NOTE — OP REPORT
DATE OF OPERATION: 2/7/2018     PREOPERATIVE DIAGNOSIS: purulent secretions, fever, leukocytosis and increase in oxygen requirement     POSTOPERATIVE DIAGNOSIS: purulent secretions, fever, leukocytosis and increase in oxygen requirement     PROCEDURE PERFORMED: Fiberoptic bronchoscopy with bronchoalveolar lavage    SURGEON: Jeff Kitchen M.D.    ANESTHESIA: Intravenous sedation, analgesia, and pharmacologic restraint     INDICATIONS: The patient is a 32 y.o. female with acute respiratory failure.     FINDINGS: Moderate secretions  RLL and LLL    SPECIMEN: Bronchoalveolar lavage for quantitative cultures    PROCEDURE: Following informed consent, the patient was properly identified and optimally positioned in bed. She was preoxygenated with 100% oxygen and placed on a regular ventilatory rate. Intravenous sedation, analgesia, and pharmacologic restraint was administered.    The fiberoptic bronchoscope was advance through the indwelling endotracheal tube.  The upper airways were suctioned. The airways were systematically and sequentially inspected and lavaged. The pooled effluent was collected in a sterile trap and submitted for quantitative cultures.     The patient tolerated the procedure well. There were no apparent complications.    ____________________________________   Jeff Kitchen M.D.    DD: 2/7/2018  11:57 AM

## 2018-02-07 NOTE — PROGRESS NOTES
2045 Call placed to Dr. Kitchen regarding trend in low UOP. Telephone order received to give patient 1L LR bolus once. Order implemented.

## 2018-02-07 NOTE — PALLIATIVE CARE
PALLIATIVE CARE FOLLOW-UP:    Discussed with PERRY Haynes.    Met with pt's stepfather Williams and Aunt Minal.  Discussed next steps with bronchoscopy and organ donation.  Family shared photos and memories of pt, discussed next steps with pt's children.  Active listening and empathic support provided.  Family expressed appreciation for child grief materials provided previously, no further questions at this time.    Thank you for allowing Palliative Care to support this pt and her family.  Contact [phone] for additional assistance, questions or concerns.
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
## 2018-02-07 NOTE — PROGRESS NOTES
Patient's sister and stepdad at bedside. Updates given.     Updates given to Bonnie from [facility] throughout the shift.

## 2018-02-07 NOTE — PROGRESS NOTES
Annalee from Lab called with critical result of WBC 30.9 at 0605. Critical lab result read back to Annalee.   Dr. Kitchen notified of critical lab result at 0655.  Critical lab result read back by Dr. Kitchen.    No new orders received.

## 2018-02-08 NOTE — PROGRESS NOTES
Lab called with critical result of WBC at 32. Critical lab result read back to lab.   This critical lab result is within parameters established by  for this patient

## 2018-02-08 NOTE — DISCHARGE PLANNING
Medical Social Work    SW attended IDT Trauma Rounds. Pt is currently a DNR and will be a donor once cardiac death takes place. Family observed at bedside. Unlikely pt will recover. SW will check-in with family and provide emotional support.

## 2018-02-08 NOTE — CARE PLAN
Problem: Ventilation Defect:  Goal: Ability to achieve and maintain unassisted ventilation or tolerate decreased levels of ventilator support  Outcome: PROGRESSING SLOWER THAN EXPECTED    Intervention: Support and monitor invasive and noninvasive mechanical ventilation  Pt on full, monitored support  Intervention: Monitor ventilator weaning response  No SBT's, pts vent settings are too high  Intervention: Perform ventilator associated pneumonia prevention interventions  HOB at or above 30 degrees. ETT cuff between 20-30 cwp  Intervention: Manage ventilation therapy by monitoring diagnostic test results  Discussed during morning rounds

## 2018-02-08 NOTE — CARE PLAN
Problem: Ventilation Defect:  Goal: Ability to achieve and maintain unassisted ventilation or tolerate decreased levels of ventilator support    Intervention: Support and monitor invasive and noninvasive mechanical ventilation  Adult Ventilation Update    Total Vent Days: 11    Patient Lines/Drains/Airways Status    Active Airway     Name: Placement date: Placement time: Site: Days:    Airway Group ET Tube Oral 7.0 01/29/18   1254   Oral   11              Barriers to Wean: FiO2 >60% or PEEP >10 CM H2O (02/07/18 0619)    Cough: Productive (02/08/18 0331)  Sputum Amount: Small (02/08/18 0331)  Sputum Color: Clear;White;Yellow (02/08/18 0331)  Sputum Consistency: Thin (02/08/18 0331)    Mobility Group  Activity Performed: Unable to mobilize (02/08/18 0200)  Assistance / Tolerance: Assistance of Two or More;Tolerates Well (02/08/18 0200)  Pt Calls for Assistance: No (02/08/18 0200)  Staff Present for Mobilization: RN (02/08/18 0200)  Reason Not Mobilized: Unstable condition (02/08/18 0200)  Mobilization Comments:  (requiring high vent settings) (02/08/18 0000)

## 2018-02-08 NOTE — CARE PLAN
Problem: Pain Management  Goal: Pain level will decrease to patient's comfort goal  Pt medicated per MAR. Non-pharmacological interventions per flowsheets.    Problem: Skin Integrity  Goal: Risk for impaired skin integrity will decrease  René skin risk assessment and complete skin assessment completed at beginning of shift. Pt is turned and repositioned q2h and PRN. Appropriate wound protocols in place.

## 2018-02-08 NOTE — OP REPORT
DATE OF OPERATION: 2/7/2018     PREOPERATIVE DIAGNOSIS: increase in oxygen requirement and refractory atelectasis     POSTOPERATIVE DIAGNOSIS: increase in oxygen requirement and refractory atelectasis     PROCEDURE PERFORMED: Fiberoptic bronchoscopy with bronchoalveolar lavage    SURGEON: Jeff Kitchen M.D.    ANESTHESIA: Intravenous sedation, analgesia, and pharmacologic restraint     INDICATIONS: The patient is a 32 y.o. female with acute respiratory failure.     FINDINGS: Moderate secretions  RLL      PROCEDURE: Following informed consent, the patient was properly identified and optimally positioned in bed. She was preoxygenated with 100% oxygen and placed on a regular ventilatory rate. Intravenous sedation, analgesia, and pharmacologic restraint was administered.    The fiberoptic bronchoscope was advance through the indwelling endotracheal tube.  The upper airways were suctioned. The airways were systematically and sequentially inspected and lavaged.    The patient tolerated the procedure well. There were no apparent complications.    ____________________________________   Jeff Kitchen M.D.    DD: 2/7/2018  5:00 PM
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
## 2018-02-08 NOTE — CARE PLAN
Problem: Respiratory:  Goal: Respiratory status will improve  Outcome: PROGRESSING AS EXPECTED  Patient on PEEP 18 and 100% FiO2. Lung sounds course and diminished bilaterally. SpO2 96-98%    Problem: Pain Management  Goal: Pain level will decrease to patient's comfort goal  Outcome: PROGRESSING AS EXPECTED  Patient on fentanyl gtt. Assessing for pain q2h. See MAR

## 2018-02-08 NOTE — PALLIATIVE CARE
PALLIATIVE CARE FOLLOW-UP:    Met with pt's mother Aida in lobby.  Aida recounted how pt needed second bronchoscopy and stated that if the opportunity for organ donation necessitated moving up withdrawal of care, then mother is ok with this.  Pt's half brother is returning to [place] Thursday night but he has already said good bye to pt.  Aida tearfully expressed feeling that pt had spoken with her about wanting organ donation and that if it is possible, this would be helpful in their grief.  Validation of thoughts and feelings, and empathic support provided.    Updated: discussed with ALANNA Dinero.    Plan:  Withdrawal of care with organ donation if possible.    Thank you for allowing Palliative Care to support this pt and her family.  Contact [phone] for additional assistance, questions or concerns.

## 2018-02-08 NOTE — DISCHARGE PLANNING
Medical Social Work    SW attempted to meet with family to check-in. Family was observed to be emotional at bedside and on a phone call. SW will return.

## 2018-02-08 NOTE — DISCHARGE PLANNING
Medical Social Work    SW met with family at bedside to check-in and offer support. Family denies SW needs at this time and they also reference a strong family support network in the area. SW informed family to notify RN if they have any needs or questions that SW can assist with, family acknowledges understanding.

## 2018-02-08 NOTE — PROGRESS NOTES
Maria Isabel form Lab called with a critical WBC count of 32.5, result read back to Misa FAJARDO aware of elevated WBC count and interventions in place.

## 2018-02-08 NOTE — RESPIRATORY CARE
Respiratory Therapy Update    Interdisciplinary Plan of Care-Goals (Indications)  Obstructive Ventilatory Defect or Pulmonary Disease without Obvious Obstruction: History / Diagnosis (02/06/18 0700)                  Cough: Productive (02/07/18 1600)  Sputum Amount: Small (02/07/18 1600)  Sputum Color: White (02/07/18 1600)  Sputum Consistency: Thin (02/07/18 1600)                          Breath Sounds  Pre/Post Intervention: Pre Intervention Assessment (02/07/18 1037)  RUL Breath Sounds: Coarse Crackles (02/07/18 1600)  RML Breath Sounds: Coarse Crackles (02/07/18 1600)  RLL Breath Sounds: Diminished (02/07/18 1600)  CHON Breath Sounds: Coarse Crackles (02/07/18 1600)  LLL Breath Sounds: Diminished (02/07/18 1600)    Therapy changed to PEEP to 18 per Dr. Kitchen at 1515  Events/Summary/Plan: bronchoscopy done again (02/07/18 6399)+ bronchoscopy done 1132 2/7

## 2018-02-08 NOTE — RESPIRATORY CARE
Adult Ventilation Update    Total Vent Days: 10    Patient Lines/Drains/Airways Status    Active Airway     Name: Placement date: Placement time: Site: Days:    Airway Group ET Tube Oral 7.0 01/29/18   1254   Oral   9              Plateau Pressure (Q Shift): 35 (02/07/18 0619)  Static Compliance (ml / cm H2O): 19.2 (02/07/18 1639)    Patient failed trials because of Barriers to Wean: FiO2 >60% or PEEP >10 CM H2O (02/07/18 0619)  Barriers to SBT    Length of Weaning Trial Length of Weaning Trial (Hours): 0 (02/06/18 0800)    Sputum/Suction   Cough: Non Productive (02/07/18 1639)  Sputum Amount: Small (02/07/18 1600)  Sputum Color: White (02/07/18 1600)  Sputum Consistency: Thin (02/07/18 1600)    Mobility Group  Activity Performed: Unable to mobilize (02/07/18 0800)  Assistance / Tolerance: Assistance of Two or More (02/05/18 1600)  Pt Calls for Assistance: No (02/06/18 2000)  Staff Present for Mobilization: RN (02/05/18 1600)  Assistive Devices: None (02/05/18 2000)  Reason Not Mobilized: Unstable condition (02/07/18 0800)  Mobilization Comments:  (FiO2 100%) (02/07/18 0800)    Events/Summary/Plan: Vent check, no changes at this time. (02/07/18 1639)

## 2018-02-08 NOTE — CONSULTS
[facility]    DONOR [facility] OBTAINED AUTHORIZATION FOR DONATION AFTER CARDIAC DEATH ON     02/06/2018 @11:14

## 2018-02-09 NOTE — CARE PLAN
Problem: Respiratory:  Goal: Respiratory status will improve  Outcome: PROGRESSING SLOWER THAN EXPECTED  Patient currently on ventilator with PEEP 18 and FiO2 100%. Will continue to monitor.    Problem: Pain Management  Goal: Pain level will decrease to patient's comfort goal  Outcome: PROGRESSING AS EXPECTED  Patient currently on fentanyl gtt. Assessing pain q2h. Medicated as needed with PRNs, see MAR. Will continue to monitor.

## 2018-02-09 NOTE — PROGRESS NOTES
"  Trauma/Surgical Progress Note    Author: Tello Matute Date & Time created: 2/9/2018   10:43 AM     Interval Events:    No significant changes or issues  To pre op ~ 1500 hr today for possible DCD  Family aware and are at bedside    Hemodynamics:  Blood pressure (!) 97/48, pulse 80, temperature (!) 38.6 °C (101.5 °F), resp. rate 16, height 1.676 m (5' 6\"), weight 89.4 kg (197 lb 1.5 oz), SpO2 97 %, unknown if currently breastfeeding.     Respiratory:  Manriquez Vent Mode: APVCMV, Rate (breaths/min): 16, PEEP/CPAP: 18, FiO2: 100, P Peak (PIP): 37, P MEAN: 24 Respiration: 16, Pulse Oximetry: 97 %     Work Of Breathing / Effort: Vented  RUL Breath Sounds: Diminished, RML Breath Sounds: Diminished, RLL Breath Sounds: Diminished, CHON Breath Sounds: Diminished, LLL Breath Sounds: Diminished  Fluids:    Intake/Output Summary (Last 24 hours) at 02/09/18 1243  Last data filed at 02/09/18 0800   Gross per 24 hour   Intake             3120 ml   Output             1085 ml   Net             2035 ml     Admit Weight: 56.7 kg (125 lb)  Current Weight: 89.4 kg (197 lb 1.5 oz)    Physical Exam   Constitutional: She appears well-developed.   HENT:   Craniotomy  incisions clean  ET tube in place  Nasoenteric tube in place   Eyes: No scleral icterus.   Pupil fixed at 2 mm   Neck: Normal range of motion. No JVD present. No tracheal deviation present.   Cardiovascular: Normal rate, regular rhythm, normal heart sounds and intact distal pulses.    Pulmonary/Chest: No respiratory distress. She has no wheezes. She has no rales.   On vent   Abdominal: Soft. Bowel sounds are normal. She exhibits no distension. There is no tenderness.   Genitourinary:   Genitourinary Comments: Lee to gravity.   Musculoskeletal: She exhibits edema. She exhibits no deformity.   Neurological:   GCS-5  withdraws   Skin: Skin is warm and dry. No rash noted. She is not diaphoretic. No erythema. No pallor.   Psychiatric:   Unable to assess   Nursing note and " vitals reviewed.      Medical Decision Making/Problem List:    Active Hospital Problems    Diagnosis   • Leukocytosis [D72.829]     Priority: High     2/7 - Bronchoscopy with BAL and blood cultures for purulent secretions, leukocytosis and chest x-ray infiltrate. Empiric vancomycin and cefepime initiated.  2/7 - Vancomycin altered to linezolid  2/9 - Antibiotic day 3 of 7     • Subdural hematoma without coma, with loc of unspecified duration, initial encounter (CMS-HCC) [S06.5X9A]     Priority: High     1.  Massive right subdural hematoma measuring 2.5 cm in thickness  2.  Additionally, 2.8 cm intra-axial right temporal hematoma and small amount   of subarachnoid hemorrhage within the right sylvian fissure  3.  Right to left shift measuring 2.1 cm with associated subfalcine herniation.   Posterior fossa mass effect with partial effacement of the basilar cisterns and   compression of the brainstem  Given dose of factor VII as well as platelet transfusion and taken immediately to the operating room for emergent craniotomy.  Central line placed and 3% NaCl bolus started: Continue protocol.  CTA: no aneurysm  2/3 GCS 4 fixed pupils, poor prognosis  2/8- DACD planned for yumiko Mcdowell MD. Neurosurgery.     • Thrombocytopenia concurrent with and due to alcoholism (CMS-HCC) [D69.59, F10.20]     Priority: High     Initial platelet count 41  Transfused 2 pheresis units in the operating room  Postop platelet count 53: Transfuse 1 pheresis unit  Continue to trend and transfuse if platelet count less than 100  1/31 transfused for low MA on TEG .  2/8-count improving     • Acute respiratory failure following trauma and surgery (CMS-HCC) [J95.821]     Priority: High     Intubated on arrival due to mental status  On full ventilator support secondary to head injury at this time  No trach given plan for donation       • Alcohol abuse [F10.10]     Priority: High     Long-standing history of alcohol abuse  SUDHIR 0.32 on  arrival  Associated pancytopenia, but Normal MCV .         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.30]     Priority: High     Diagnoses documented on a prior admission  Mild elevation of AST  Somewhat abnormal coagulation function       • Elevated INR [R79.1]     Priority: Medium     INR 1.67 on admission labs  Due to active bleeding, transfused 1 dose of factor VII  Transfused fresh plasma the operating room         Core Measures & Quality Metrics:  Labs reviewed, Medications reviewed and Radiology images reviewed  Lee catheter: Critically Ill - Requiring Accurate Measurement of Urinary Output      DVT Prophylaxis: Contraindicated - High bleeding risk  DVT prophylaxis - mechanical: SCDs  Ulcer prophylaxis: Yes  Antibiotics: Treating active infection/contamination beyond 24 hours perioperative coverage      ANALIA Score  Discussed patient condition with RN, RT, Pharmacy and .  CRITICAL CARE TIME EXCLUDING PROCEDURES: 39 minutes

## 2018-02-09 NOTE — CARE PLAN
Problem: Ventilation Defect:  Goal: Ability to achieve and maintain unassisted ventilation or tolerate decreased levels of ventilator support    Intervention: Support and monitor invasive and noninvasive mechanical ventilation  Pt remains on APV/CMV : 16/340/100%/18. Pt has 7.0 ETT in place @ 24. Moderate,thick, brown/tan secretions noted t/o shift.  RR decreased to 16 from 20 per MD order. Orders updated in Epic. No wean. Plan to take to OR w/ [facility] at 2000 2/8/18.

## 2018-02-09 NOTE — OP REPORT
DATE OF SERVICE:  02/08/2018    SURGEON:  Jeff Kitchen MD    PREOPERATIVE DIAGNOSIS:  Skin lesion, need to rule out melanoma.    POSTOPERATIVE DIAGNOSIS:  Skin lesion, need to rule out melanoma.    PROCEDURE PERFORMED:  Skin biopsy x2, both less than 1 cm.    INDICATIONS:  This is a 32-year-old woman, who unfortunately has a devastating   traumatic brain injury and is being consented for donation after cardiac   death.  She had a history of questionable lesions with melanoma and there were   2 lesions on her back that were felt to be sufficiently suspicious to require   biopsy to allow for clearance for transplantation.    DESCRIPTION OF PROCEDURE:  The patient was placed on her left side, lesions   were identified and excised with a 15 blade and sent for pathologic   evaluation.  The skin was then approximated with 2 nylon sutures.  Dressings   were placed.  Patient tolerated the procedure well without apparent   complications.       ____________________________________     MD LANDON BLISS / NTS    DD:  02/08/2018 17:34:08  DT:  02/08/2018 18:19:51    D#:  [id number]  Job#:  [id number]

## 2018-02-09 NOTE — PALLIATIVE CARE
PALLIATIVE CARE FOLLOW-UP:    Pt scheduled for 3pm today per mother.  Ongoing empathic support provided to family.    Thank you for allowing Palliative Care to support this pt and her family.  Contact [phone] for additional assistance, questions or concerns.

## 2018-02-09 NOTE — DISCHARGE PLANNING
Informed by [facility] representative that the pt entered donor status on; 2/6/2018, at 11:14. TC to Anna with [facility] [employer] [facility] [place] and updated her on the time.

## 2018-02-09 NOTE — PALLIATIVE CARE
PALLIATIVE CARE FOLLOW-UP:    Met with pt's mother Aida in ICU lobby.  Provided empathic, anticipatory grief support and discussed bereavement and grief journey going forward.  Aida has many sources of strength including family and jose.  Aida plans for only her and  Williams to be with pt at OR tonight.    Plan:  Pt's [phone]  Updated: ICU RN Richelle    Thank you for allowing Palliative Care to support this pt and her family.  Contact [phone] for additional assistance, questions or concerns.

## 2018-02-10 ENCOUNTER — APPOINTMENT (OUTPATIENT)
Dept: RADIOLOGY | Facility: MEDICAL CENTER | Age: 33
DRG: 025 | End: 2018-02-10
Attending: SURGERY
Payer: MEDICAID

## 2018-02-10 PROCEDURE — 770022 HCHG ROOM/CARE - ICU (200)

## 2018-02-10 NOTE — OR NURSING
Donation after Cardiac Death started in pre-op @ 15:36, patient pronounced in OR @ 15:58 by Payton Frey MD, [facility].

## 2018-02-10 NOTE — PROGRESS NOTES
At bedside outside of OR for Donation after Cardiac Death.      Heparin 27,000 units was given at 1536, allowed to circulate for 5 minutes, then 5mg ativan, 5mg morphine given for comfort, patient extubated to room air.     Desatted quickly to 40s, then 20s, blood pressure starting to drop.     More medication given for air hunger, rapid breathing.    Patient more comfortable, heart rate decreasing.    Asystole and 5 minutes started; after 5 minutes, no carotid pulse, pupils fixed and dilated, no chest rise or respirations, absent heart sounds.    Time of cardiac death 1558.      Critical care time 40 minutes

## 2018-02-11 ENCOUNTER — APPOINTMENT (OUTPATIENT)
Dept: RADIOLOGY | Facility: MEDICAL CENTER | Age: 33
DRG: 025 | End: 2018-02-11
Attending: SURGERY
Payer: MEDICAID

## 2018-02-11 LAB
BACTERIA BLD CULT: NORMAL
BACTERIA BLD CULT: NORMAL
BACTERIA UR CULT: NORMAL
SIGNIFICANT IND 70042: NORMAL
SITE SITE: NORMAL
SOURCE SOURCE: NORMAL

## 2018-02-11 PROCEDURE — 770022 HCHG ROOM/CARE - ICU (200)

## 2018-02-12 ENCOUNTER — APPOINTMENT (OUTPATIENT)
Dept: RADIOLOGY | Facility: MEDICAL CENTER | Age: 33
DRG: 025 | End: 2018-02-12
Attending: SURGERY
Payer: MEDICAID

## 2018-03-17 NOTE — DISCHARGE SUMMARY
DATE OF ADMISSION:  01/29/2018    DATE OF DISCHARGE:  02/09/2018    Patient is a after cardiac death organ donor.    CONSULTS:  Neurosurgical consult obtained with Dr. Lino Reyes on January 29th.    PROCEDURE PERFORMED:  Patient underwent a right hemicraniectomy, evacuation of   right subdural hematoma, evacuation of right intraparenchymal hemorrhage with   Dr. Reyes.  On January 29th, she also underwent insertion of a right   internal jugular catheter by Dr. Trevino.  On February 7th, she underwent   bronchoscopy with bronchoalveolar lavage with Dr. Kitchen and on February 8th,   she underwent biopsy of the skin lesions x2 to rule out melanoma.    ADMITTING DIAGNOSES:  1.  Subdural hematoma, massive, right, 2.5 cm.  2.  Intraparenchymal hemorrhage, right temporal, 2.8 cm.  3.  Thrombocytopenia, initial platelet count 41.  4.  Elevated INR, 1.67 on admission.  5.  Acute respiratory failure, intubated on arrival due to decreased mental   status.  6.  Alcohol abuse, blood alcohol was 0.36 on arrival.  7.  Cirrhosis with alcoholism.    DISCHARGE DIAGNOSES:  1.  Acute respiratory failure following trauma.  2.  Alcohol abuse.  3.  Cirrhosis with alcoholism.  4.  Leukocytosis.  5.  Subdural hematoma.  6.  Thrombocytopenia.  7.  Elevated INR.    HOSPITAL COURSE:  Patient was initially admitted as a non-trauma after the   etiology was noted and patient was going to the OR, trauma surgery was   consulted.  Dr. Trevino saw the patient and admitted her to the ICU, which she   went postoperatively.  The patient had a long history of alcoholism and   subsequent sequelae were well known to family.  Despite the evacuation of the   large subdural, the patient's neuro status never significantly improved.  She   did develop a fever and leukocytosis, underwent bronchoscopy with   bronchoalveolar lavage and was started on empiric antibiotics.  The patient,   with the craniectomy, did not progress to brain death.  She still  required   significant support.  Family wished to look to see if donation was still an   option.  Ultimately, as she did not improve, family elected to attempt   donation after cardiac death (DCD).  On February 9th, the patient was brought   to preoperative area where she was given Ativan and morphine and was   extubated.  She developed asystole in approximately 5 minutes.  She was   pronounced dead at 1558 hours and was taken to the OR for organ retrieval,   possible kidneys.  Family was in attendance.       ____________________________________     MD KVNG NAVA / ALISON    DD:  03/17/2018 11:46:11  DT:  03/17/2018 12:26:18    D#:  [id number]  Job#:  [id number]
